# Patient Record
Sex: FEMALE | Race: WHITE | HISPANIC OR LATINO | Employment: UNEMPLOYED | URBAN - METROPOLITAN AREA
[De-identification: names, ages, dates, MRNs, and addresses within clinical notes are randomized per-mention and may not be internally consistent; named-entity substitution may affect disease eponyms.]

---

## 2024-08-29 ENCOUNTER — HOSPITAL ENCOUNTER (INPATIENT)
Facility: HOSPITAL | Age: 34
LOS: 2 days | Discharge: HOME | End: 2024-08-31
Attending: OBSTETRICS & GYNECOLOGY | Admitting: OBSTETRICS & GYNECOLOGY

## 2024-08-29 PROBLEM — O42.919 PRETERM PREMATURE RUPTURE OF MEMBRANES (PPROM) WITH UNKNOWN ONSET OF LABOR: Status: ACTIVE | Noted: 2024-08-29

## 2024-08-29 LAB
ABO GROUP BLD: NORMAL
ABO GROUP BLD: NORMAL
ALP SERPL-CCNC: 41 U/L (ref 39–117)
ALT SERPL W P-5'-P-CCNC: 10 U/L (ref 1–33)
AST SERPL-CCNC: 12 U/L (ref 1–32)
BASOPHILS # BLD AUTO: 0.02 10*3/MM3 (ref 0–0.2)
BASOPHILS NFR BLD AUTO: 0.2 % (ref 0–1.5)
BILIRUB SERPL-MCNC: 0.2 MG/DL (ref 0–1.2)
BLD GP AB SCN SERPL QL: NEGATIVE
CREAT SERPL-MCNC: 0.48 MG/DL (ref 0.57–1)
D-LACTATE SERPL-SCNC: 1.1 MMOL/L (ref 0.5–2)
DEPRECATED RDW RBC AUTO: 42.8 FL (ref 37–54)
EOSINOPHIL # BLD AUTO: 0.14 10*3/MM3 (ref 0–0.4)
EOSINOPHIL NFR BLD AUTO: 1.4 % (ref 0.3–6.2)
ERYTHROCYTE [DISTWIDTH] IN BLOOD BY AUTOMATED COUNT: 13.2 % (ref 12.3–15.4)
GLUCOSE BLDC GLUCOMTR-MCNC: 110 MG/DL (ref 70–130)
GLUCOSE BLDC GLUCOMTR-MCNC: 92 MG/DL (ref 70–130)
HCT VFR BLD AUTO: 38.1 % (ref 34–46.6)
HGB BLD-MCNC: 13 G/DL (ref 12–15.9)
IMM GRANULOCYTES # BLD AUTO: 0.16 10*3/MM3 (ref 0–0.05)
IMM GRANULOCYTES NFR BLD AUTO: 1.6 % (ref 0–0.5)
LDH SERPL-CCNC: 147 U/L (ref 135–214)
LYMPHOCYTES # BLD AUTO: 1.78 10*3/MM3 (ref 0.7–3.1)
LYMPHOCYTES NFR BLD AUTO: 18 % (ref 19.6–45.3)
MCH RBC QN AUTO: 30.2 PG (ref 26.6–33)
MCHC RBC AUTO-ENTMCNC: 34.1 G/DL (ref 31.5–35.7)
MCV RBC AUTO: 88.4 FL (ref 79–97)
MONOCYTES # BLD AUTO: 0.72 10*3/MM3 (ref 0.1–0.9)
MONOCYTES NFR BLD AUTO: 7.3 % (ref 5–12)
NEUTROPHILS NFR BLD AUTO: 7.09 10*3/MM3 (ref 1.7–7)
NEUTROPHILS NFR BLD AUTO: 71.5 % (ref 42.7–76)
NRBC BLD AUTO-RTO: 0 /100 WBC (ref 0–0.2)
PLATELET # BLD AUTO: 204 10*3/MM3 (ref 140–450)
PMV BLD AUTO: 9.9 FL (ref 6–12)
RBC # BLD AUTO: 4.31 10*6/MM3 (ref 3.77–5.28)
RH BLD: POSITIVE
RH BLD: POSITIVE
T&S EXPIRATION DATE: NORMAL
URATE SERPL-MCNC: 3.6 MG/DL (ref 2.4–5.7)
WBC NRBC COR # BLD AUTO: 9.91 10*3/MM3 (ref 3.4–10.8)

## 2024-08-29 PROCEDURE — 86780 TREPONEMA PALLIDUM: CPT | Performed by: OBSTETRICS & GYNECOLOGY

## 2024-08-29 PROCEDURE — 86901 BLOOD TYPING SEROLOGIC RH(D): CPT | Performed by: OBSTETRICS & GYNECOLOGY

## 2024-08-29 PROCEDURE — 82247 BILIRUBIN TOTAL: CPT | Performed by: OBSTETRICS & GYNECOLOGY

## 2024-08-29 PROCEDURE — 99223 1ST HOSP IP/OBS HIGH 75: CPT | Performed by: STUDENT IN AN ORGANIZED HEALTH CARE EDUCATION/TRAINING PROGRAM

## 2024-08-29 PROCEDURE — 82565 ASSAY OF CREATININE: CPT | Performed by: OBSTETRICS & GYNECOLOGY

## 2024-08-29 PROCEDURE — 36415 COLL VENOUS BLD VENIPUNCTURE: CPT | Performed by: OBSTETRICS & GYNECOLOGY

## 2024-08-29 PROCEDURE — 86901 BLOOD TYPING SEROLOGIC RH(D): CPT

## 2024-08-29 PROCEDURE — 86900 BLOOD TYPING SEROLOGIC ABO: CPT

## 2024-08-29 PROCEDURE — 83615 LACTATE (LD) (LDH) ENZYME: CPT | Performed by: OBSTETRICS & GYNECOLOGY

## 2024-08-29 PROCEDURE — 25010000002 AMPICILLIN PER 500 MG: Performed by: OBSTETRICS & GYNECOLOGY

## 2024-08-29 PROCEDURE — 86850 RBC ANTIBODY SCREEN: CPT | Performed by: OBSTETRICS & GYNECOLOGY

## 2024-08-29 PROCEDURE — 84075 ASSAY ALKALINE PHOSPHATASE: CPT | Performed by: OBSTETRICS & GYNECOLOGY

## 2024-08-29 PROCEDURE — 80053 COMPREHEN METABOLIC PANEL: CPT | Performed by: OBSTETRICS & GYNECOLOGY

## 2024-08-29 PROCEDURE — 86900 BLOOD TYPING SEROLOGIC ABO: CPT | Performed by: OBSTETRICS & GYNECOLOGY

## 2024-08-29 PROCEDURE — 25810000003 LACTATED RINGERS PER 1000 ML: Performed by: OBSTETRICS & GYNECOLOGY

## 2024-08-29 PROCEDURE — 84550 ASSAY OF BLOOD/URIC ACID: CPT | Performed by: OBSTETRICS & GYNECOLOGY

## 2024-08-29 PROCEDURE — 82948 REAGENT STRIP/BLOOD GLUCOSE: CPT

## 2024-08-29 PROCEDURE — 85025 COMPLETE CBC W/AUTO DIFF WBC: CPT | Performed by: OBSTETRICS & GYNECOLOGY

## 2024-08-29 PROCEDURE — 83605 ASSAY OF LACTIC ACID: CPT | Performed by: OBSTETRICS & GYNECOLOGY

## 2024-08-29 RX ORDER — ASPIRIN 81 MG/1
81 TABLET, CHEWABLE ORAL DAILY
Status: ON HOLD | COMMUNITY

## 2024-08-29 RX ORDER — DOCUSATE SODIUM 100 MG/1
100 CAPSULE, LIQUID FILLED ORAL 2 TIMES DAILY PRN
Status: DISCONTINUED | OUTPATIENT
Start: 2024-08-29 | End: 2024-08-31 | Stop reason: HOSPADM

## 2024-08-29 RX ORDER — PRENATAL WITH FERROUS FUM AND FOLIC ACID 3080; 920; 120; 400; 22; 1.84; 3; 20; 10; 1; 12; 200; 27; 25; 2 [IU]/1; [IU]/1; MG/1; [IU]/1; MG/1; MG/1; MG/1; MG/1; MG/1; MG/1; UG/1; MG/1; MG/1; MG/1; MG/1
1 TABLET ORAL DAILY
Status: ON HOLD | COMMUNITY

## 2024-08-29 RX ORDER — ACETAMINOPHEN 325 MG/1
650 TABLET ORAL EVERY 4 HOURS PRN
Status: DISCONTINUED | OUTPATIENT
Start: 2024-08-29 | End: 2024-08-29

## 2024-08-29 RX ORDER — ONDANSETRON 2 MG/ML
4 INJECTION INTRAMUSCULAR; INTRAVENOUS EVERY 8 HOURS PRN
Status: DISCONTINUED | OUTPATIENT
Start: 2024-08-29 | End: 2024-08-31 | Stop reason: HOSPADM

## 2024-08-29 RX ORDER — ONDANSETRON 4 MG/1
8 TABLET, ORALLY DISINTEGRATING ORAL EVERY 8 HOURS PRN
Status: DISCONTINUED | OUTPATIENT
Start: 2024-08-29 | End: 2024-08-31 | Stop reason: HOSPADM

## 2024-08-29 RX ORDER — SODIUM CHLORIDE 9 MG/ML
40 INJECTION, SOLUTION INTRAVENOUS AS NEEDED
Status: DISCONTINUED | OUTPATIENT
Start: 2024-08-29 | End: 2024-08-31 | Stop reason: HOSPADM

## 2024-08-29 RX ORDER — INSULIN GLARGINE 100 [IU]/ML
100 INJECTION, SOLUTION SUBCUTANEOUS 2 TIMES DAILY
Status: ON HOLD | COMMUNITY
End: 2024-08-31

## 2024-08-29 RX ORDER — LIDOCAINE HYDROCHLORIDE 10 MG/ML
0.5 INJECTION, SOLUTION EPIDURAL; INFILTRATION; INTRACAUDAL; PERINEURAL ONCE AS NEEDED
Status: DISCONTINUED | OUTPATIENT
Start: 2024-08-29 | End: 2024-08-31 | Stop reason: HOSPADM

## 2024-08-29 RX ORDER — AMOXICILLIN 250 MG/1
500 CAPSULE ORAL EVERY 8 HOURS
Status: DISCONTINUED | OUTPATIENT
Start: 2024-08-31 | End: 2024-08-31 | Stop reason: HOSPADM

## 2024-08-29 RX ORDER — SODIUM CHLORIDE, SODIUM LACTATE, POTASSIUM CHLORIDE, CALCIUM CHLORIDE 600; 310; 30; 20 MG/100ML; MG/100ML; MG/100ML; MG/100ML
125 INJECTION, SOLUTION INTRAVENOUS CONTINUOUS
Status: DISCONTINUED | OUTPATIENT
Start: 2024-08-29 | End: 2024-08-31 | Stop reason: HOSPADM

## 2024-08-29 RX ORDER — SODIUM CHLORIDE 0.9 % (FLUSH) 0.9 %
10 SYRINGE (ML) INJECTION EVERY 12 HOURS SCHEDULED
Status: DISCONTINUED | OUTPATIENT
Start: 2024-08-29 | End: 2024-08-31 | Stop reason: HOSPADM

## 2024-08-29 RX ORDER — AZITHROMYCIN 250 MG/1
1000 TABLET, FILM COATED ORAL ONCE
Status: COMPLETED | OUTPATIENT
Start: 2024-08-29 | End: 2024-08-29

## 2024-08-29 RX ORDER — BISACODYL 10 MG
10 SUPPOSITORY, RECTAL RECTAL DAILY PRN
Status: DISCONTINUED | OUTPATIENT
Start: 2024-08-29 | End: 2024-08-31 | Stop reason: SDUPTHER

## 2024-08-29 RX ORDER — SODIUM CHLORIDE 0.9 % (FLUSH) 0.9 %
10 SYRINGE (ML) INJECTION AS NEEDED
Status: DISCONTINUED | OUTPATIENT
Start: 2024-08-29 | End: 2024-08-31 | Stop reason: HOSPADM

## 2024-08-29 RX ADMIN — AMPICILLIN SODIUM 2000 MG: 2 INJECTION, POWDER, FOR SOLUTION INTRAMUSCULAR; INTRAVENOUS at 23:08

## 2024-08-29 RX ADMIN — SODIUM CHLORIDE, POTASSIUM CHLORIDE, SODIUM LACTATE AND CALCIUM CHLORIDE 125 ML/HR: 600; 310; 30; 20 INJECTION, SOLUTION INTRAVENOUS at 19:48

## 2024-08-29 RX ADMIN — SODIUM CHLORIDE, POTASSIUM CHLORIDE, SODIUM LACTATE AND CALCIUM CHLORIDE 125 ML/HR: 600; 310; 30; 20 INJECTION, SOLUTION INTRAVENOUS at 21:58

## 2024-08-29 RX ADMIN — AZITHROMYCIN DIHYDRATE 1000 MG: 250 TABLET ORAL at 23:11

## 2024-08-30 ENCOUNTER — APPOINTMENT (OUTPATIENT)
Dept: WOMENS IMAGING | Facility: HOSPITAL | Age: 34
End: 2024-08-30

## 2024-08-30 LAB
ALBUMIN SERPL-MCNC: 4 G/DL (ref 3.5–5.2)
ALBUMIN/GLOB SERPL: 1.3 G/DL
ALP SERPL-CCNC: 39 U/L (ref 39–117)
ALT SERPL W P-5'-P-CCNC: 10 U/L (ref 1–33)
ANION GAP SERPL CALCULATED.3IONS-SCNC: 14 MMOL/L (ref 5–15)
AST SERPL-CCNC: 12 U/L (ref 1–32)
BASOPHILS # BLD AUTO: 0.02 10*3/MM3 (ref 0–0.2)
BASOPHILS NFR BLD AUTO: 0.2 % (ref 0–1.5)
BILIRUB SERPL-MCNC: 0.2 MG/DL (ref 0–1.2)
BUN SERPL-MCNC: 7 MG/DL (ref 6–20)
BUN/CREAT SERPL: 15.2 (ref 7–25)
CALCIUM SPEC-SCNC: 10.1 MG/DL (ref 8.6–10.5)
CHLORIDE SERPL-SCNC: 103 MMOL/L (ref 98–107)
CO2 SERPL-SCNC: 21 MMOL/L (ref 22–29)
CREAT SERPL-MCNC: 0.46 MG/DL (ref 0.57–1)
DEPRECATED RDW RBC AUTO: 44 FL (ref 37–54)
EGFRCR SERPLBLD CKD-EPI 2021: 130.6 ML/MIN/1.73
EOSINOPHIL # BLD AUTO: 0.11 10*3/MM3 (ref 0–0.4)
EOSINOPHIL NFR BLD AUTO: 1.1 % (ref 0.3–6.2)
ERYTHROCYTE [DISTWIDTH] IN BLOOD BY AUTOMATED COUNT: 13.3 % (ref 12.3–15.4)
GLOBULIN UR ELPH-MCNC: 3 GM/DL
GLUCOSE BLDC GLUCOMTR-MCNC: 104 MG/DL (ref 70–130)
GLUCOSE BLDC GLUCOMTR-MCNC: 170 MG/DL (ref 70–130)
GLUCOSE BLDC GLUCOMTR-MCNC: 256 MG/DL (ref 70–130)
GLUCOSE BLDC GLUCOMTR-MCNC: 82 MG/DL (ref 70–130)
GLUCOSE BLDC GLUCOMTR-MCNC: 92 MG/DL (ref 70–130)
GLUCOSE SERPL-MCNC: 78 MG/DL (ref 65–99)
HCT VFR BLD AUTO: 35.3 % (ref 34–46.6)
HGB BLD-MCNC: 11.5 G/DL (ref 12–15.9)
IMM GRANULOCYTES # BLD AUTO: 0.13 10*3/MM3 (ref 0–0.05)
IMM GRANULOCYTES NFR BLD AUTO: 1.3 % (ref 0–0.5)
LYMPHOCYTES # BLD AUTO: 1.8 10*3/MM3 (ref 0.7–3.1)
LYMPHOCYTES NFR BLD AUTO: 18.4 % (ref 19.6–45.3)
MCH RBC QN AUTO: 29.3 PG (ref 26.6–33)
MCHC RBC AUTO-ENTMCNC: 32.6 G/DL (ref 31.5–35.7)
MCV RBC AUTO: 90.1 FL (ref 79–97)
MONOCYTES # BLD AUTO: 0.79 10*3/MM3 (ref 0.1–0.9)
MONOCYTES NFR BLD AUTO: 8.1 % (ref 5–12)
NEUTROPHILS NFR BLD AUTO: 6.91 10*3/MM3 (ref 1.7–7)
NEUTROPHILS NFR BLD AUTO: 70.9 % (ref 42.7–76)
NRBC BLD AUTO-RTO: 0 /100 WBC (ref 0–0.2)
PLATELET # BLD AUTO: 191 10*3/MM3 (ref 140–450)
PMV BLD AUTO: 10.1 FL (ref 6–12)
POTASSIUM SERPL-SCNC: 4.2 MMOL/L (ref 3.5–5.2)
PROT SERPL-MCNC: 7 G/DL (ref 6–8.5)
RBC # BLD AUTO: 3.92 10*6/MM3 (ref 3.77–5.28)
SODIUM SERPL-SCNC: 138 MMOL/L (ref 136–145)
TREPONEMA PALLIDUM IGG+IGM AB [PRESENCE] IN SERUM OR PLASMA BY IMMUNOASSAY: NORMAL
WBC NRBC COR # BLD AUTO: 9.76 10*3/MM3 (ref 3.4–10.8)

## 2024-08-30 PROCEDURE — 76815 OB US LIMITED FETUS(S): CPT | Performed by: OBSTETRICS & GYNECOLOGY

## 2024-08-30 PROCEDURE — 63710000001 INSULIN LISPRO (HUMAN) PER 5 UNITS: Performed by: STUDENT IN AN ORGANIZED HEALTH CARE EDUCATION/TRAINING PROGRAM

## 2024-08-30 PROCEDURE — 99254 IP/OBS CNSLTJ NEW/EST MOD 60: CPT | Performed by: OBSTETRICS & GYNECOLOGY

## 2024-08-30 PROCEDURE — 25010000002 METOCLOPRAMIDE PER 10 MG: Performed by: STUDENT IN AN ORGANIZED HEALTH CARE EDUCATION/TRAINING PROGRAM

## 2024-08-30 PROCEDURE — 25010000002 DIPHENHYDRAMINE PER 50 MG: Performed by: STUDENT IN AN ORGANIZED HEALTH CARE EDUCATION/TRAINING PROGRAM

## 2024-08-30 PROCEDURE — 25010000002 AMPICILLIN PER 500 MG: Performed by: OBSTETRICS & GYNECOLOGY

## 2024-08-30 PROCEDURE — 63710000001 INSULIN GLARGINE PER 5 UNITS: Performed by: STUDENT IN AN ORGANIZED HEALTH CARE EDUCATION/TRAINING PROGRAM

## 2024-08-30 PROCEDURE — 76815 OB US LIMITED FETUS(S): CPT

## 2024-08-30 PROCEDURE — 25810000003 LACTATED RINGERS PER 1000 ML: Performed by: OBSTETRICS & GYNECOLOGY

## 2024-08-30 PROCEDURE — 82948 REAGENT STRIP/BLOOD GLUCOSE: CPT

## 2024-08-30 PROCEDURE — 85025 COMPLETE CBC W/AUTO DIFF WBC: CPT | Performed by: STUDENT IN AN ORGANIZED HEALTH CARE EDUCATION/TRAINING PROGRAM

## 2024-08-30 PROCEDURE — 25010000002 BETAMETHASONE ACET & SOD PHOS PER 4 MG: Performed by: OBSTETRICS & GYNECOLOGY

## 2024-08-30 RX ORDER — INSULIN LISPRO 100 [IU]/ML
60 INJECTION, SOLUTION INTRAVENOUS; SUBCUTANEOUS
Status: DISCONTINUED | OUTPATIENT
Start: 2024-08-30 | End: 2024-08-31 | Stop reason: HOSPADM

## 2024-08-30 RX ORDER — ACETAMINOPHEN 325 MG/1
650 TABLET ORAL EVERY 6 HOURS PRN
Status: DISCONTINUED | OUTPATIENT
Start: 2024-08-30 | End: 2024-08-31 | Stop reason: HOSPADM

## 2024-08-30 RX ORDER — METOCLOPRAMIDE HYDROCHLORIDE 5 MG/ML
10 INJECTION INTRAMUSCULAR; INTRAVENOUS ONCE
Status: COMPLETED | OUTPATIENT
Start: 2024-08-30 | End: 2024-08-30

## 2024-08-30 RX ORDER — BETAMETHASONE SODIUM PHOSPHATE AND BETAMETHASONE ACETATE 3; 3 MG/ML; MG/ML
12 INJECTION, SUSPENSION INTRA-ARTICULAR; INTRALESIONAL; INTRAMUSCULAR; SOFT TISSUE EVERY 24 HOURS
Status: COMPLETED | OUTPATIENT
Start: 2024-08-30 | End: 2024-08-31

## 2024-08-30 RX ORDER — DIPHENHYDRAMINE HYDROCHLORIDE 50 MG/ML
25 INJECTION INTRAMUSCULAR; INTRAVENOUS ONCE
Status: COMPLETED | OUTPATIENT
Start: 2024-08-30 | End: 2024-08-30

## 2024-08-30 RX ORDER — FAMOTIDINE 10 MG/ML
20 INJECTION, SOLUTION INTRAVENOUS EVERY 12 HOURS SCHEDULED
Status: DISCONTINUED | OUTPATIENT
Start: 2024-08-30 | End: 2024-08-31 | Stop reason: HOSPADM

## 2024-08-30 RX ADMIN — METOCLOPRAMIDE 10 MG: 5 INJECTION, SOLUTION INTRAMUSCULAR; INTRAVENOUS at 01:33

## 2024-08-30 RX ADMIN — INSULIN LISPRO 60 UNITS: 100 INJECTION, SOLUTION INTRAVENOUS; SUBCUTANEOUS at 16:37

## 2024-08-30 RX ADMIN — FAMOTIDINE 20 MG: 10 INJECTION, SOLUTION INTRAVENOUS at 10:05

## 2024-08-30 RX ADMIN — DIPHENHYDRAMINE HYDROCHLORIDE 25 MG: 50 INJECTION INTRAMUSCULAR; INTRAVENOUS at 01:34

## 2024-08-30 RX ADMIN — SODIUM CHLORIDE, POTASSIUM CHLORIDE, SODIUM LACTATE AND CALCIUM CHLORIDE 125 ML/HR: 600; 310; 30; 20 INJECTION, SOLUTION INTRAVENOUS at 13:31

## 2024-08-30 RX ADMIN — SODIUM CHLORIDE, POTASSIUM CHLORIDE, SODIUM LACTATE AND CALCIUM CHLORIDE 125 ML/HR: 600; 310; 30; 20 INJECTION, SOLUTION INTRAVENOUS at 03:42

## 2024-08-30 RX ADMIN — AMPICILLIN SODIUM 2000 MG: 2 INJECTION, POWDER, FOR SOLUTION INTRAMUSCULAR; INTRAVENOUS at 10:05

## 2024-08-30 RX ADMIN — FAMOTIDINE 20 MG: 10 INJECTION, SOLUTION INTRAVENOUS at 22:32

## 2024-08-30 RX ADMIN — AMPICILLIN SODIUM 2000 MG: 2 INJECTION, POWDER, FOR SOLUTION INTRAMUSCULAR; INTRAVENOUS at 03:42

## 2024-08-30 RX ADMIN — AMPICILLIN SODIUM 2000 MG: 2 INJECTION, POWDER, FOR SOLUTION INTRAMUSCULAR; INTRAVENOUS at 22:31

## 2024-08-30 RX ADMIN — INSULIN GLARGINE 80 UNITS: 100 INJECTION, SOLUTION SUBCUTANEOUS at 01:33

## 2024-08-30 RX ADMIN — INSULIN LISPRO 60 UNITS: 100 INJECTION, SOLUTION INTRAVENOUS; SUBCUTANEOUS at 08:38

## 2024-08-30 RX ADMIN — SODIUM CHLORIDE, POTASSIUM CHLORIDE, SODIUM LACTATE AND CALCIUM CHLORIDE 125 ML/HR: 600; 310; 30; 20 INJECTION, SOLUTION INTRAVENOUS at 23:53

## 2024-08-30 RX ADMIN — INSULIN GLARGINE 90 UNITS: 100 INJECTION, SOLUTION SUBCUTANEOUS at 22:31

## 2024-08-30 RX ADMIN — AMPICILLIN SODIUM 2000 MG: 2 INJECTION, POWDER, FOR SOLUTION INTRAMUSCULAR; INTRAVENOUS at 15:26

## 2024-08-30 RX ADMIN — FAMOTIDINE 20 MG: 10 INJECTION, SOLUTION INTRAVENOUS at 01:34

## 2024-08-30 RX ADMIN — BETAMETHASONE SODIUM PHOSPHATE AND BETAMETHASONE ACETATE 12 MG: 3; 3 INJECTION, SUSPENSION INTRA-ARTICULAR; INTRALESIONAL; INTRAMUSCULAR at 15:26

## 2024-08-30 RX ADMIN — Medication 10 ML: at 09:00

## 2024-08-30 NOTE — PROGRESS NOTES
Visited with patient to establish rapport, provide emotional and spiritual support.  Patient is [employer], does not go to any particular Congregation or denomination.  Shared that she believes God will help her carry her baby to 28 weeks, is staying positive with that belief.  I affirmed her optimism while at the same time reminded her of what the doctors said about infection, and she agreed.  We had a long conversation (this  speaks Kiswahili) about her family of origin, her children, her significant other Haris who was present in the room and supportive.

## 2024-08-30 NOTE — H&P
"[facility]  Obstetric History and Physical    Chief Complaint   Patient presents with    Rupture of Membranes       HPI:    Patient is a 33 y.o. female  currently at 21w6d, who presents after being seen at .   She is a type II DM and sees Dr. Polanco for her diabetes management.  She reported that she had been having some cramping and vaginal leaking for the last week and had presented to triage three days in a row, but she was always sent home.   Dr. Polanco scanned her and noted that she had very little fluid around the infant suspicious for PPROM.  Demetra continues to be tender at the uterus  She denies further leaking, bleeding.  She denies fever or chills.   She is Croatian speaking only.          The following portions of the patients history were reviewed and updated as appropriate: current medications, allergies, past medical history, past surgical history, past family history, past social history and problem list .       Prenatal Information:   Maternal Prenatal Labs  Blood Type ABO Type   Date Value Ref Range Status   2024 B  Final      Rh Status RH type   Date Value Ref Range Status   2024 Positive  Final      Antibody Screen Antibody Screen   Date Value Ref Range Status   2024 Negative  Final      Gonnorhea No results found for: \"GCCX\"   Chlamydia No results found for: \"CLAMYDCU\"   RPR No results found for: \"RPR\"   Syphilis Antibody No results found for: \"SYPHILIS\"   Rubella No results found for: \"RUBELLAIGGIN\"   Hepatitis B Surface Antigen No results found for: \"HEPBSAG\"   HIV-1 Antibody No results found for: \"LABHIV1\"   Hepatitis C Antibody No results found for: \"HEPCAB\"   Rapid Urin Drug Screen No results found for: \"AMPMETHU\", \"BARBITSCNUR\", \"LABBENZSCN\", \"LABMETHSCN\", \"LABOPIASCN\", \"THCURSCR\", \"COCAINEUR\", \"AMPHETSCREEN\", \"PROPOXSCN\", \"BUPRENORSCNU\", \"METAMPSCNUR\", \"OXYCODONESCN\", \"TRICYCLICSCN\"   Group B Strep Culture No results found for: \"GBSANTIGEN\"           " External Prenatal Results       Pregnancy Outside Results - Transcribed From Office Records - See Scanned Records For Details       Test Value Date Time    ABO       Rh       Antibody Screen       Varicella IgG       Rubella       Hgb  13.0 g/dL 24    Hct  38.1 % 24    HgB A1c        1h GTT       3h GTT Fasting       3h GTT 1 hour       3h GTT 2 hour       3h GTT 3 hour        Gonorrhea (discrete)       Chlamydia (discrete)       RPR       Syphils cascade: TP-Ab (FTA)       TP-Ab       TP-Ab (EIA)       TPPA       HBsAg       Herpes Simplex Virus PCR       Herpes Simplex VIrus Culture       HIV       Hep C RNA Quant PCR       Hep C Antibody       AFP       NIPT       Cystic Fibroisis        Group B Strep       GBS Susceptibility to Clindamycin       GBS Susceptibility to Erythromycin       Fetal Fibronectin       Genetic Testing, Maternal Blood                 Drug Screening       Test Value Date Time    Urine Drug Screen       Amphetamine Screen       Barbiturate Screen       Benzodiazepine Screen       Methadone Screen       Phencyclidine Screen       Opiates Screen       THC Screen       Cocaine Screen       Propoxyphene Screen       Buprenorphine Screen       Methamphetamine Screen       Oxycodone Screen       Tricyclic Antidepressants Screen                 Legend    ^: Historical                              Past OB History:     OB History    Para Term  AB Living   4 2 2 0 1 2   SAB IAB Ectopic Molar Multiple Live Births   1 0 0 0 0 0      # Outcome Date GA Lbr Clayton/2nd Weight Sex Type Anes PTL Lv   4 Current            3 SAB 2024 8w0d    SAB      2 Term 17 38w0d   M CS-Unspec      1 Term 16 42w0d   F Vag-Spont          Past Medical History: Past Medical History:   Diagnosis Date    DM type 2 (diabetes mellitus, type 2)       Past Surgical History Past Surgical History:   Procedure Laterality Date     SECTION      CHOLECYSTECTOMY        Family History:  History reviewed. No pertinent family history.   Social History:  reports that she has never smoked. She has never been exposed to tobacco smoke. She has never used smokeless tobacco.   reports that she does not currently use alcohol.   reports that she does not currently use drugs.        Review of Systems  Denies fever, HA, CP, Shortness of air, muscle weakness,                                             and rashes      Objective     Vital Signs Range for the last 24 hours  Temperature: Temp:  [98.4 °F (36.9 °C)] 98.4 °F (36.9 °C)   Temp Source: Temp src: Oral   BP: BP: (126)/(70) 126/70   Pulse: Heart Rate:  [81-87] 83   Respirations:  16   SPO2: SpO2:  [97 %-98 %] 97 %   O2 Amount (l/min):     O2 Devices     Weight:       Physical Examination: General appearance - Alert, no acute physical distress, but anxious  Chest -Breathing is unlaboured   Heart - Regular rate.   Abdomen - soft, nontender, nondistended,   no rebound tenderness noted  No guarding,   Extremities - pedal edema  - none     Presentation:  Breech    Cervix: Exam by:  M.D.    Dilation:  Closed   Effacement:  Thick    Station:  High      Fetal Heart Rate Assessment   Method:     Beats/min:     Baseline:  130s   Varibility:     Accels:     Decels:     Tracing Category:       Uterine Assessment   Method: Method: per patient report   Frequency (min):     Ctx Count in 10 min:     Duration:     Intensity: Contraction Intensity: no contractions   Intensity by IUPC:     Resting Tone:     Resting Tone by IUPC:           Laboratory Results:   Lab Results   Component Value Date     08/29/2024    HGB 13.0 08/29/2024    HCT 38.1 08/29/2024    WBC 9.91 08/29/2024      Lab Results   Component Value Date    CREATININE 0.48 (L) 08/29/2024    AST 12 08/29/2024    ALT 10 08/29/2024    BILITOT 0.2 08/29/2024       Lactic Acid:   1.1         Assessment:  1. Intrauterine pregnancy at 21w6d weeks gestation  2. PPROM with probable chorioamnionitis   3. Type II  diabetes       Plan:  1. Admit  2. IV, CBC, T&S, CMP, Lactic acid  3. Will discuss further, but likely IOL with Cytotec 200 mcg Q 4 hrs.   4.  PPROM ABX       Denys Rojas MD  2024  20:50 EDT        Triage Note    Care assumed form Dr Rojas.    Complex patient with oligohydramnios starting several weeks ago. Has been evaluated by OSH previously with no evidence of rupture. Reportedly was admitted for observation and then discharged with plan for outpatient followup. Today she still has minimal fluid and reports some abdominal pain.     Her clinical picture is very concerning for previable  rupture of membranes of unclear duration, though the PPROM has not been definitively diagnosed. Given this picture, it is very possible she will develop an intrauterine infection that could lead to rapidly developing sepsis. She does not yet meet two criteria for chorioamnionitis (normal maternal and fetal heart rate, no purulent vaginal discharge, no leukocytosis).    Assessment:  Suspected previable  rupture of membranes  Abdominal pain   T2DM  H/o CD x1 (2017 in [facility] for macrosomia -- infant weight 8lb @ 38wks)  Possible fetal cardiac defect      Plan:  - Admit for observation  - Latency abx  - Hold ANCS for now given <22wks GA and need to monitor WBC for leukocytosis  - Repeat CBC in AM  - PDC US in AM -- concern for fetal cardiac defect  - Insulin for T2DM control: Lantus 100U BID, Humalog 60U TID AC with corrections       utilized via iPad for conversation.    Dispo: Admit to floor. Unclear duration of admission.  D/w Dr Sherri Rolle MD  24  22:04 EDT    OB Laborist

## 2024-08-30 NOTE — CASE MANAGEMENT/SOCIAL WORK
Continued Stay Note  [facility]     Patient Name: Demetra Betancur  MRN: [id number]  Today's Date: 8/30/2024    Admit Date: 8/29/2024    Plan: MSW available   Discharge Plan       Row Name 08/30/24 1534       Plan    Plan MSW available    Plan Comments Spoke with pt using Ipad  # [id number]. Pt lives with FOB and her 7yo daughter and 8 yo son. Children attend [employer]. Pt reports food insecurity, struggles with transportation and utility bills in [facility]. Pt is unemployed. FOB is employed and usually is paid $300 per week. Pt reports she went to local food pantry 2 weeks ago. States she has an electric bill due in Sept 2024 that she is struggling to pay. MSW did mention speaking with the  for resources. MSW did refer pt to [employer], [employer], [employer] for assistance with pt's verbal permission.                   Discharge Codes    No documentation.                       MARY Shaw

## 2024-08-30 NOTE — CONSULTS
"Maternal Fetal Medicine Consult    Patient Name: Demetra Betancur  : 1990  MRN: [id number]  Date of Service: 2024  Requesting Provider: Denys Rojas MD    ID: 33 y.o.  at 22w0d    Consultation due to:  premature rupture of membranes (PPROM) with unknown onset of labor    Pt was sent to L&D on 2024 after her anatomic survey revealed GERARDO of only 1.7cms. Upon presentation, the reported to the MS3 that \"She has been leaking fluid from her vagina for the past 3 days. She describes the fluid as \"like pee\". She describes the color as yellow, with a urine-like odor. She says the sneezing and crouching worsens the fluid discharge.\" A speculum exam was performed in Triage and was negative for pooling. An Amnisure was done and was negative. Patient was deemed not ruptured. Her GERARDO was repeated daily and remained extremely low despite IVH. After 3 days, patient was sent home with precautions.     Patient then re-presented to Triage daily on 3 separate days with complaints of vaginal bleeding and lower abdominal/low back pain and pressure. She was again examined and as speculum exam was negative and Amnisure was negative, she was sent home.     Patient then presented to my  Diabetes in Pregnancy [facility] and told the story. She reports having blood on the tissue when she wiped while going to the bathroom. When I asked if she had any \"gushes of fluid\" she replied \"no.\" She then showed me a picture of her underwear from the day prior -- it showed cotton underwear soaked with watery blood, consistent with rupture of membranes with a trace of blood tinge. On exam, her lower abdomen was TTP and there was referred discomfort to the lower abdomen with palpation of the uterine fundus.     I had a long discussion with the patient through an excellent  explaining that I am fairly sure she has ruptured her amniotic sac. I reviewed the complications of this for baby including pulmonary " hypoplasia, contractures, infection, death. I also reviewed maternal complications including infection, possibly severe and necessitating ICU admission, hemorrhage, and death. I explained that there is no real way to know if baby will develop functional lungs until baby is born.     I recommended admission to [facility] (patient adamently declined to return to ) for further evaluation and discussion.     Since admission, patient has had a normal WBC count and has been AFVSS. Decision was made to start latency antibiotics and get a consult from NICU and continue to discuss.     Pregnancy course significant for:  Patient Active Problem List    Diagnosis     * premature rupture of membranes (PPROM) with unknown onset of labor [O42.919]        Prenatal Labs  Lab Results   Component Value Date    HGB 11.5 (L) 2024    HEPBSAG Negative 2024    ABO B 2024    RH Positive 2024    ABSCRN Negative 2024    [id number] Non Reactive 2024         OB HISTORY    OB History          4    Para   2    Term   2            AB   1    Living   2         SAB   1    IAB        Ectopic        Molar        Multiple        Live Births                    PAST MEDICAL HISTORY    Past Medical History:   Diagnosis Date    DM type 2 (diabetes mellitus, type 2)        PAST SURGICAL HISTORY     has a past surgical history that includes  section and Cholecystectomy.    CURRENT MEDICATIONS    No current facility-administered medications on file prior to encounter.     Current Outpatient Medications on File Prior to Encounter   Medication Sig Dispense Refill    aspirin 81 MG chewable tablet Chew 1 tablet Daily.      insulin glargine (LANTUS, SEMGLEE) 100 UNIT/ML injection Inject 100 Units under the skin into the appropriate area as directed 2 (Two) Times a Day.      Prenatal Vit-Fe Fumarate-FA (Prenatal ) 27-1 MG tablet tablet Take 1 tablet by mouth Daily.         ALLERGIES    Patient has no  known allergies.    SOCIAL HISTORY    Social History     Tobacco Use   Smoking Status Never    Passive exposure: Never   Smokeless Tobacco Never     Social History     Substance and Sexual Activity   Alcohol Use Not Currently     Social History     Substance and Sexual Activity   Drug Use Not Currently       FAMILY HISTORY  The patient has a family history of not on file.     REVIEW OF SYSTEMS  Reports fetal movement is stable  Denies leakage of amniotic fluid.   Denies vaginal bleeding  She reports Some cramping  All other systems reviewed and are negative    PHYSICAL EXAMINATION    Vital Signs Range for the last 24 hours  Temperature: Temp:  [98.1 °F (36.7 °C)-98.8 °F (37.1 °C)] 98.1 °F (36.7 °C)   Temp Source: Temp src: Oral   BP: BP: (111-133)/(57-78) 129/63   Pulse: Heart Rate:  [71-98] 71   Respirations: Resp:  [16] 16   Weight:       Constitutional:  Well developed, well nourished, no acute distress, well-groomed.   Respiratory:  No increased work of breathing.   Cardiovascular:  Appears well perfused.   Gastrointestinal:  Soft, gravid, nontender.  Uterus: Fundus appropriate for dates. Mildly tender during ultrasound.  Neurologic:  Alert & oriented x 3,  no focal deficits noted.   Psychiatric:  Speech and behavior appropriate.   Extremities: No cyanosis, clubbing or edema.    Ultrasound:  Breech, S=D, GERARDO 1.7cms, anterior placenta, nl CL, markedly limited anatomy survey but normal kidneys, bladder, and stomach visualized.     Labs:  Labs:  Lab Results   Component Value Date    WBC 9.76 08/30/2024    HGB 11.5 (L) 08/30/2024    HCT 35.3 08/30/2024    MCV 90.1 08/30/2024     08/30/2024     (H) 03/14/2022    POCGLU 82 08/30/2024    CREATININE 0.48 (L) 08/29/2024    CREATININE 0.46 (L) 08/29/2024    URICACID 3.6 08/29/2024    AST 12 08/29/2024    AST 12 08/29/2024    ALT 10 08/29/2024    ALT 10 08/29/2024     08/29/2024     Results from last 7 days   Lab Units 08/29/24 2015 08/29/24  1946    ABO TYPING  B B   RH TYPING  Positive Positive   ANTIBODY SCREEN   --  Negative       ASSESSMENT/PLAN:  33 y.o.  at 22w0d Admitted for evaluation and discussion of risk/options/wishes in pregnancy complicated by previable PPROM at at least 20wks of pregnancy. After further discussion and counseling by both the NICU attending and myself, patient would like to continue pregnancy with close outpatient monitoring. Once reaches 24wks, will continue to discuss her wishes as she does want baby resuscitated, but does not want to go back to . Will consider whether can send her to [place] or [facility] for management.     Complete IV portion of latency antibiotics  Will give betamethasone for fetal lung maturity  Plan is for follow-up next week on Tuesday in [facility] for OB appointment and ultrasound, from there will monitor 1-2x/wk and weekly ultrasound  Strict precautions reviewed, patient aware of risks    Approximately 45 minutes floor time was spent in care of this patient, of which greater than 50% was spent in face-to-face consultation and coordination of care.    Megha Polanco MD   24   13:31 EDT

## 2024-08-31 VITALS
HEART RATE: 71 BPM | RESPIRATION RATE: 18 BRPM | DIASTOLIC BLOOD PRESSURE: 66 MMHG | OXYGEN SATURATION: 98 % | TEMPERATURE: 99.3 F | SYSTOLIC BLOOD PRESSURE: 120 MMHG

## 2024-08-31 LAB
GLUCOSE BLDC GLUCOMTR-MCNC: 183 MG/DL (ref 70–130)
GLUCOSE BLDC GLUCOMTR-MCNC: 184 MG/DL (ref 70–130)

## 2024-08-31 PROCEDURE — 63710000001 INSULIN GLARGINE PER 5 UNITS: Performed by: STUDENT IN AN ORGANIZED HEALTH CARE EDUCATION/TRAINING PROGRAM

## 2024-08-31 PROCEDURE — 25010000002 BETAMETHASONE ACET & SOD PHOS PER 4 MG: Performed by: OBSTETRICS & GYNECOLOGY

## 2024-08-31 PROCEDURE — 63710000001 INSULIN LISPRO (HUMAN) PER 5 UNITS: Performed by: STUDENT IN AN ORGANIZED HEALTH CARE EDUCATION/TRAINING PROGRAM

## 2024-08-31 PROCEDURE — 25810000003 LACTATED RINGERS PER 1000 ML: Performed by: OBSTETRICS & GYNECOLOGY

## 2024-08-31 PROCEDURE — 82948 REAGENT STRIP/BLOOD GLUCOSE: CPT

## 2024-08-31 PROCEDURE — 99239 HOSP IP/OBS DSCHRG MGMT >30: CPT | Performed by: OBSTETRICS & GYNECOLOGY

## 2024-08-31 PROCEDURE — 25010000002 AMPICILLIN PER 500 MG: Performed by: OBSTETRICS & GYNECOLOGY

## 2024-08-31 RX ORDER — POLYETHYLENE GLYCOL 3350 17 G/17G
17 POWDER, FOR SOLUTION ORAL DAILY
Qty: 238 G | Refills: 2 | Status: ON HOLD | OUTPATIENT
Start: 2024-08-31

## 2024-08-31 RX ORDER — AMOXICILLIN 500 MG/1
500 CAPSULE ORAL EVERY 8 HOURS
Qty: 15 CAPSULE | Refills: 0 | Status: ON HOLD | OUTPATIENT
Start: 2024-08-31

## 2024-08-31 RX ORDER — POLYETHYLENE GLYCOL 3350 17 G/17G
17 POWDER, FOR SOLUTION ORAL DAILY
Status: DISCONTINUED | OUTPATIENT
Start: 2024-08-31 | End: 2024-08-31 | Stop reason: HOSPADM

## 2024-08-31 RX ORDER — BISACODYL 10 MG
10 SUPPOSITORY, RECTAL RECTAL DAILY
Status: DISCONTINUED | OUTPATIENT
Start: 2024-08-31 | End: 2024-08-31 | Stop reason: HOSPADM

## 2024-08-31 RX ORDER — INSULIN GLARGINE 100 [IU]/ML
100 INJECTION, SOLUTION SUBCUTANEOUS 2 TIMES DAILY
Qty: 10 ML | Refills: 12 | Status: ON HOLD | OUTPATIENT
Start: 2024-08-31

## 2024-08-31 RX ADMIN — SODIUM CHLORIDE, POTASSIUM CHLORIDE, SODIUM LACTATE AND CALCIUM CHLORIDE 125 ML/HR: 600; 310; 30; 20 INJECTION, SOLUTION INTRAVENOUS at 09:12

## 2024-08-31 RX ADMIN — FAMOTIDINE 20 MG: 10 INJECTION, SOLUTION INTRAVENOUS at 09:16

## 2024-08-31 RX ADMIN — AMPICILLIN SODIUM 2000 MG: 2 INJECTION, POWDER, FOR SOLUTION INTRAMUSCULAR; INTRAVENOUS at 09:14

## 2024-08-31 RX ADMIN — POLYETHYLENE GLYCOL 3350 17 G: 17 POWDER, FOR SOLUTION ORAL at 13:18

## 2024-08-31 RX ADMIN — DOCUSATE SODIUM 100 MG: 100 CAPSULE, LIQUID FILLED ORAL at 02:33

## 2024-08-31 RX ADMIN — INSULIN LISPRO 60 UNITS: 100 INJECTION, SOLUTION INTRAVENOUS; SUBCUTANEOUS at 09:48

## 2024-08-31 RX ADMIN — BETAMETHASONE SODIUM PHOSPHATE AND BETAMETHASONE ACETATE 12 MG: 3; 3 INJECTION, SUSPENSION INTRA-ARTICULAR; INTRALESIONAL; INTRAMUSCULAR at 15:47

## 2024-08-31 RX ADMIN — AMPICILLIN SODIUM 2000 MG: 2 INJECTION, POWDER, FOR SOLUTION INTRAMUSCULAR; INTRAVENOUS at 15:47

## 2024-08-31 RX ADMIN — INSULIN LISPRO 60 UNITS: 100 INJECTION, SOLUTION INTRAVENOUS; SUBCUTANEOUS at 16:57

## 2024-08-31 RX ADMIN — ACETAMINOPHEN 650 MG: 325 TABLET ORAL at 00:09

## 2024-08-31 RX ADMIN — INSULIN GLARGINE 90 UNITS: 100 INJECTION, SOLUTION SUBCUTANEOUS at 09:47

## 2024-08-31 RX ADMIN — AMPICILLIN SODIUM 2000 MG: 2 INJECTION, POWDER, FOR SOLUTION INTRAMUSCULAR; INTRAVENOUS at 04:06

## 2024-08-31 NOTE — DISCHARGE SUMMARY
Admission date: 2024  Discharge date: 24    Referring Provider: Denys Rojas MD    Admission diagnosis:   premature rupture of membranes (PPROM) with unknown onset of labor [O42.919]       premature rupture of membranes (PPROM) with unknown onset of labor       Discharge diagnosis:  33-year-old -0-1-2 at 22 weeks 1 day gestation   premature rupture membranes  No evidence of labor or chorioamnionitis  4   insulin requiring type 2 diabetes mellitus  5.   Status post steroids for fetal lung maturity  and     Consultants: Maternal-fetal medicine and neonatology      Hospital course:      Patient 33-year-old female -0-1-2 at 21 weeks 6 days, who presents after being seen at .  She is a type 2 diabetes followed by Dr. Julian phillips for diabetes management.  She reported that she had been having some cramping and vaginal leaking for the last week.  He was seen by Dr. Julian phillips noted to have very little fluid suspicious of pre-PROM.  She also appeared slightly tender along uterus.  She is Icelandic-speaking only.  She was admitted to antepartum unit labs, and given  antibiotics.  Patient seen by neonatology and PDC.  PDC ultrasound on , breech, size equal dates GERARDO 1.7 cm antral placenta normal cervical length.  Normal kidneys bladder and stomach visualized.  She received 40 hours of IV latency antibiotics and received steroids for fetal lung maturity.  After discussion with MFM and neonatology patient be discharged to home on 5 days of amoxicillin, with close follow-up twice weekly with MFM on hospital day 3 after no signs of labor or chorioamnionitis.  Questions answered with .        Vitals:    24 0759   BP: 115/70   Pulse: 88   Resp: 18   Temp: 98.1 °F (36.7 °C)   SpO2:        GENERAL:  Well-developed, well-nourished in no acute distress.   ABD:  Gravid uterus nontender    FHT's 140  EXTREMITIES:  No clubbing, cyanosis or  edema.  PSYCHIATRIC:  Normal affect and mood.      Discharge condition: stable  Discharge diet   Dietary Orders (From admission, onward)       Start     Ordered    08/29/24 2206  Diet: Regular/House; Fluid Consistency: Thin (IDDSI 0)  Diet Effective Now        References:    Diet Order Crosswalk   Question Answer Comment   Diets: Regular/House    Fluid Consistency: Thin (IDDSI 0)        08/29/24 2206                  Additional Instructions: Call with fevers, uncontrolled nausea/vomiting/pain.  Medications:      Discharge Medications        New Medications        Instructions Start Date   amoxicillin 500 MG capsule  Commonly known as: AMOXIL   500 mg, Oral, Every 8 Hours      polyethylene glycol 17 g packet  Commonly known as: MIRALAX   17 g, Oral, Daily             Continue These Medications        Instructions Start Date   aspirin 81 MG chewable tablet   81 mg, Oral, Daily      insulin glargine 100 UNIT/ML injection  Commonly known as: LANTUS, SEMGLEE   100 Units, Subcutaneous, 2 Times Daily      Prenatal 27-1 27-1 MG tablet tablet   1 tablet, Oral, Daily             Disposition: home   Follow up:   Future Appointments   Date Time Provider Department Center   9/3/2024 11:30 AM [facility] US 1  [facility]  [facility]   9/3/2024 11:30 AM  [facility] NEW PATIENT [facility] None       I spent over 30 minutes on discharge activity which included: face-to-face encounter counseling with the patient, reviewing the data in the system, coordination of the care with the nursing staff as well as consultants, documentation, and entering orders.      Filiberto Culver,   12:56 EDT

## 2024-08-31 NOTE — NURSING NOTE
Discussed discharge care with patient,  with Dr Culver and  present. Pt aware to take oral antibiotics Amoxicillin as prescribed until completed (three times a day). Will take first dose this evening. (Last IV antibiotic given at 1547 today.). Discussed S/S of infection. Pinkish clear fluid noted on pad. No fever or abd tenderness. FHT-135. Second dose of Betamethsone 12 mg given IM at 1547 in left VL. Pt states she has enough Humalog, Lantus and syringes until next appointment. Box of alcohol swabs given.    Left 2 phone messages to on-call  regarding pt need of transportation to appointments at [facility] starting on Tuesday, 9/3 at 11:30 and then every Tuesday and Thursday thereafter. Left email for Ayla MORSE to continue to follow with transportation concerns (one car and  works) and Mariel Webster for consideration of [facility]. Due to the Labor Day holiday, plan will be for  to bring and drop pt off for her appt on Tuesday with [facility] providing transportation back to home in [place], until future transportation means can be arranged.  Pt and  verbalized understanding to all of above care. Ochoa RNC-OB, MSN  BSN

## 2024-08-31 NOTE — CONSULTS
Prenatal Consult    Referring OB:  Dr. Polanco  Principal Problem: Previable premature rupture of membranes at at least 20 weeks of pregnancy, currently 22 0/7 weeks gestation with estimated fetal weight of 440 grams () and oligohydramnios (GERARDO 1.7).  Pregnancy also complicated by maternal Type 2 DM.      Reason for Consultation:  previable PPROM    Maternal History:   First last name is a 33 y.o. yr/o  with:  rupture of membranes.  The  EDC is  Estimated Date of Delivery: Estimated Date of Delivery: 1/3/25    Consult:  father, mother, and   available for discussion.  We reviewed the fetal and  aspects of the above conditions to include: estimated survival rate, intraventricular hemorrhage, and chronic lung disease/pulmonary hypoplasia, as well as need for very specific medical care, treatment team, and equipment needed, given very early gestation. US today shows EFW of 440 grams and oligohydramnios with GERARDO of 1.7. Mother has concerns with her obstetric care at  and does not wish to delivery her son, , there.  We spoke at length on how 's best chance of survival would be if he was delivered at an institution with a level IV NICU (, [facility], or [place]) where he did not have to endure a transport from a different hospital (dramatically increasing his risk for IVH if delivered at outlying hospital) and were equipped with the staff and equipment he would need.  Mother and father both stated they understood he would be a complex patient to take care of at this point in gestation and would have a higher likelihood of survival if delivered at an institution with a higher level of NICU (level IV) care than we can offer at [facility] at this time.     We will plan to attend delivery when requested if patient delivers at [facility].      Plan for  resuscitation:  Parents would like to have a full resuscitation.  We discussed airway management and how this is what  would be offered at this early in gestation.  We also discussed the potential harm/pain chest compressions would cause without adding to his likelihood of survival.    Thank you for allowing us to participate in the care of your patient.     Elizabeth Childers MD  08/31/24  15:43 EDT      30 minutes were spent in consultation, greater than 90% face to face time.

## 2024-08-31 NOTE — PLAN OF CARE
Goal Outcome Evaluation:      Pt remains stable and afebrile, hopeful for discharge upon finishing IV antibiotics and IM steroids

## 2024-09-01 DIAGNOSIS — O42.919 PRETERM PREMATURE RUPTURE OF MEMBRANES (PPROM) WITH UNKNOWN ONSET OF LABOR: Primary | ICD-10-CM

## 2024-09-03 ENCOUNTER — DOCUMENTATION (OUTPATIENT)
Dept: SOCIAL WORK | Facility: HOSPITAL | Age: 34
End: 2024-09-03

## 2024-09-03 ENCOUNTER — OFFICE VISIT (OUTPATIENT)
Dept: OBSTETRICS AND GYNECOLOGY | Facility: HOSPITAL | Age: 34
End: 2024-09-03

## 2024-09-03 ENCOUNTER — PATIENT OUTREACH (OUTPATIENT)
Dept: LABOR AND DELIVERY | Facility: HOSPITAL | Age: 34
End: 2024-09-03

## 2024-09-03 ENCOUNTER — REFERRAL TRIAGE (OUTPATIENT)
Dept: LABOR AND DELIVERY | Facility: HOSPITAL | Age: 34
End: 2024-09-03

## 2024-09-03 ENCOUNTER — HOSPITAL ENCOUNTER (OUTPATIENT)
Dept: WOMENS IMAGING | Facility: HOSPITAL | Age: 34
Discharge: HOME | End: 2024-09-03
Admitting: OBSTETRICS & GYNECOLOGY

## 2024-09-03 VITALS
DIASTOLIC BLOOD PRESSURE: 71 MMHG | WEIGHT: 293 LBS | HEIGHT: 65 IN | BODY MASS INDEX: 48.82 KG/M2 | SYSTOLIC BLOOD PRESSURE: 114 MMHG

## 2024-09-03 DIAGNOSIS — O42.919 PRETERM PREMATURE RUPTURE OF MEMBRANES (PPROM) WITH UNKNOWN ONSET OF LABOR: ICD-10-CM

## 2024-09-03 DIAGNOSIS — O42.919 PRETERM PREMATURE RUPTURE OF MEMBRANES (PPROM) WITH UNKNOWN ONSET OF LABOR: Primary | ICD-10-CM

## 2024-09-03 PROCEDURE — 76815 OB US LIMITED FETUS(S): CPT

## 2024-09-03 NOTE — ASSESSMENT & PLAN NOTE
Patient without evidence of chorioamnionitis, labor or abruption today   We discussed checking temperature twice daily and coming to OB Triage for any evidence of fever, pain, bleeding   She continues to desire expectant management with planned admission at 24 - 26 weeks GA   She will follow up with MFM twice weekly

## 2024-09-03 NOTE — PROGRESS NOTES
"    Maternal/Fetal Medicine Follow Up Note     Name: Demetra Betancur    : 1990     MRN: [id number]     Referring Provider: Megha Polanco MD    Chief Complaint  PPROM, T2DM, Prev C/S x 1    Subjective     History of Present Illness:  Demetra Betancur is a 33 y.o.  22w4d who presents today for follow up in the setting of known previable PPROM   No fever, pain, VB.   Checking temp at home   Is having leaking clear fluid     HECTOR: Estimated Date of Delivery: 1/3/25     ROS:   As noted in HPI.     Objective     Vital Signs  /71   Ht 165.1 cm (65\")   Wt 133 kg (293 lb)   Estimated body mass index is 48.76 kg/m² as calculated from the following:    Height as of this encounter: 165.1 cm (65\").    Weight as of this encounter: 133 kg (293 lb).    Ultrasound Impression:   See viewpoint      Assessment and Plan     Demetra Betancur is a 33 y.o.  22w4d     Diagnoses and all orders for this visit:    1.  premature rupture of membranes (PPROM) with unknown onset of labor (Primary)  Assessment & Plan:  Patient without evidence of chorioamnionitis, labor or abruption today   We discussed checking temperature twice daily and coming to OB Triage for any evidence of fever, pain, bleeding   She continues to desire expectant management with planned admission at 24 - 26 weeks GA   She will follow up with MFM twice weekly              Follow Up  3 days     I spent 20 minutes caring for the patient on the day of service. This included: obtaining or reviewing a separately obtained medical history, reviewing patient records, performing a medically appropriate exam and/or evaluation, counseling or educating the patient/family/caregiver, ordering medications, labs, and/or procedures and documenting such in the medical record. This does not include time spent on review and interpretation of other tests such as fetal ultrasound or the performance of other procedures such as " amniocentesis or CVS.    Margarita Reynolds MD FACOG  Maternal Fetal Medicine, [facility]     2024

## 2024-09-03 NOTE — OUTREACH NOTE
[facility]  Enrollment    Current Estimated Gestational Age: 22w4d    Questions/Answers      Flowsheet Row Responses   Would like to participate? Yes   Date of Intake Visit 09/03/24            [facility]  Intake    Current Estimated Gestational Age: 22w4d    Intake Assessment      Flowsheet Row Responses   Best Method for Contacting Cell   Currently Employed No   Able to keep appointments as scheduled No  [Difficulty with transportation]   Do you have a dentist? No   Resources Presently Utilizing: None   Maternal Warning Signs Provided   Other: Provided   Comments [employer]   Other Education WIC Benefits, SNAP Benefits, Transportation Assistance, Other            Learning Assessment      Flowsheet Row Responses   Relationship Patient   Learner Name Demetra   Does the learner have any barriers to learning? Language   What is the preferred language of the learner for medical teaching? Mohawk   Is an  required? Yes   How does the learner prefer to learn new concepts? Pictures/Video, Reading              Tobacco, Alcohol, and Drug History     reports that she has never smoked. She has never been exposed to tobacco smoke. She has never used smokeless tobacco.   reports that she does not currently use alcohol.   reports that she does not currently use drugs.        [facility]  Check-In    Current Estimated Gestational Age: 22w4d      Questions/Answers      Flowsheet Row Responses   Best Method for Contacting Cell   Demographics Reviewed Yes   Currently Employed No   Able to keep appointments as scheduled No  [Difficulty with transportation]   Baby Active/Feeling Fetal Movemen Yes   How are you presently feeling? OK   Are you having any of the following symptoms? --  [Denies]   Questions regarding prenatal visits or tests to be ordered? No   Equipment presently used at home: Glucometer   Education related to new diagnoses/home equipment No   Supplies ready for baby Crib    Resource/Environmental Concerns Financial, Reliable Transportation   Do you have any questions related to your care experience, your pregnancy, plans for delivery, any concerns, etc? No   Other Education WIC Benefits, SNAP Benefits, Transportation Assistance, Other            Demetra is seeing Dr. Polanco for her prenatal care. Her history is significant for Type 2 DM, she has had a , and gall bladder surgery.    She has experienced trauma in her life a couple of years ago which left her with depression for which she received counseling at that time. No issues since then.  She has no SI/HI and currently feels safe. Discussed that we will be screening periodically for  and postpartum changes with mood looking for trends which may need to be addressed. VU.    She is feeling well, reports good fetal movement. She has begun gathering items she will need for baby care. She is nervous about labor, birth and caring for a .      [facility] reviewed with patient. She does not have insurance. She struggles with transportation as her  works during the day when she needs to go to appointments. She relays that she is afraid to take the bus since she doesn't speak or read English she can't navigate it safely. She has been unable to pay for her insulin due to very high cost. Collaborated with MARY Pratt for assistance with transportation home today and to assist with getting some medication until she can get her prescription transferred to the pharmacy she has found where her medication will be less expensive.    Contact information provided. Encouraged to call with questions, concerns, or for support. Will plan f/u tomorrow for transportation assistance.    LIBRADO Alvarez RN  Maternity Nurse Navigator    9/3/2024, 17:11 EDT

## 2024-09-05 ENCOUNTER — PATIENT OUTREACH (OUTPATIENT)
Dept: LABOR AND DELIVERY | Facility: HOSPITAL | Age: 34
End: 2024-09-05

## 2024-09-05 NOTE — OUTREACH NOTE
[facility]  Check-In    Current Estimated Gestational Age: 22w6d      Questions/Answers      Flowsheet Row Responses   Best Method for Contacting Cell   Demographics Reviewed Yes   Currently Employed No   How are you presently feeling? Good   Are you having any of the following symptoms? --  [Denies any issues]   Resource/Environmental Concerns Financial, Reliable Transportation   Do you have any questions related to your care experience, your pregnancy, plans for delivery, any concerns, etc? No            [facility] Line  #[id number] utilized today. Demetra has transportation arranged for her appointment tomorrow. Discussed that I will bring her information about bus schedules and routes tomorrow to help her get to appointments in the future.    LIBRADO Alvarez RN  Maternity Nurse Navigator    9/5/2024, 16:52 EDT

## 2024-09-06 ENCOUNTER — OFFICE VISIT (OUTPATIENT)
Dept: OBSTETRICS AND GYNECOLOGY | Facility: HOSPITAL | Age: 34
End: 2024-09-06

## 2024-09-06 ENCOUNTER — HOSPITAL ENCOUNTER (OUTPATIENT)
Dept: WOMENS IMAGING | Facility: HOSPITAL | Age: 34
Discharge: HOME | End: 2024-09-06
Admitting: OBSTETRICS & GYNECOLOGY

## 2024-09-06 DIAGNOSIS — O42.919 PRETERM PREMATURE RUPTURE OF MEMBRANES (PPROM) WITH UNKNOWN ONSET OF LABOR: ICD-10-CM

## 2024-09-06 DIAGNOSIS — O42.919 PRETERM PREMATURE RUPTURE OF MEMBRANES (PPROM) WITH UNKNOWN ONSET OF LABOR: Primary | ICD-10-CM

## 2024-09-06 PROCEDURE — 76815 OB US LIMITED FETUS(S): CPT

## 2024-09-06 NOTE — PROGRESS NOTES
"    Maternal/Fetal Medicine Follow Up Note     Name: Demetra Betancur    : 1990     MRN: [id number]     Referring Provider: MFM Prenatal     Chief Complaint  PPROM     Subjective     History of Present Illness:  Demetra Betancur is a 33 y.o.  23w0d who presents today for follow up in the setting of known PPROM   No interval issues   No fevers.   No VB though is having leaking of fluid   No pain     HECTOR: Estimated Date of Delivery: 1/3/25     ROS:   As noted in HPI.     Objective     Vital Signs  There were no vitals taken for this visit.  Estimated body mass index is 48.76 kg/m² as calculated from the following:    Height as of 9/3/24: 165.1 cm (65\").    Weight as of 9/3/24: 133 kg (293 lb).    NAD   Abdomen gravid, nontender     Ultrasound Impression:   See viewpoint      Assessment and Plan     Demetra Betancur is a 33 y.o.  23w0d     Diagnoses and all orders for this visit:    1.  premature rupture of membranes (PPROM) with unknown onset of labor (Primary)  -     [facility]  Diagn[facility]; Future     Precautions reviewed   Follow up twice weekly until admission at 24 - 26 weeks GA     Follow Up  Twice weekly     I spent 10 minutes caring for the patient on the day of service. This included: obtaining or reviewing a separately obtained medical history, reviewing patient records, performing a medically appropriate exam and/or evaluation, counseling or educating the patient/family/caregiver, ordering medications, labs, and/or procedures and documenting such in the medical record. This does not include time spent on review and interpretation of other tests such as fetal ultrasound or the performance of other procedures such as amniocentesis or CVS.    Margarita Reynolds MD FACOG  Maternal Fetal Medicine, [facility]    Diagnostic [place]     2024  "

## 2024-09-06 NOTE — ASSESSMENT & PLAN NOTE
No evidence of chorio, abruption or labor currently   Reviewed precautions   Plan follow up twice weekly until admission at 24 - 26 weeks GA

## 2024-09-09 DIAGNOSIS — O42.919 PRETERM PREMATURE RUPTURE OF MEMBRANES (PPROM) WITH UNKNOWN ONSET OF LABOR: Primary | ICD-10-CM

## 2024-09-10 ENCOUNTER — PATIENT OUTREACH (OUTPATIENT)
Dept: LABOR AND DELIVERY | Facility: HOSPITAL | Age: 34
End: 2024-09-10

## 2024-09-10 ENCOUNTER — OFFICE VISIT (OUTPATIENT)
Dept: OBSTETRICS AND GYNECOLOGY | Facility: HOSPITAL | Age: 34
End: 2024-09-10

## 2024-09-10 ENCOUNTER — HOSPITAL ENCOUNTER (OUTPATIENT)
Dept: WOMENS IMAGING | Facility: HOSPITAL | Age: 34
Discharge: HOME | End: 2024-09-10
Admitting: OBSTETRICS & GYNECOLOGY

## 2024-09-10 VITALS — WEIGHT: 293 LBS | BODY MASS INDEX: 49.42 KG/M2 | DIASTOLIC BLOOD PRESSURE: 63 MMHG | SYSTOLIC BLOOD PRESSURE: 116 MMHG

## 2024-09-10 DIAGNOSIS — O42.919 PRETERM PREMATURE RUPTURE OF MEMBRANES (PPROM) WITH UNKNOWN ONSET OF LABOR: ICD-10-CM

## 2024-09-10 DIAGNOSIS — E11.9 TYPE 2 DIABETES MELLITUS WITHOUT COMPLICATION, WITH LONG-TERM CURRENT USE OF INSULIN: Primary | ICD-10-CM

## 2024-09-10 DIAGNOSIS — Z79.4 TYPE 2 DIABETES MELLITUS WITHOUT COMPLICATION, WITH LONG-TERM CURRENT USE OF INSULIN: Primary | ICD-10-CM

## 2024-09-10 LAB — GLUCOSE BLDC GLUCOMTR-MCNC: 173 MG/DL (ref 70–130)

## 2024-09-10 PROCEDURE — 76815 OB US LIMITED FETUS(S): CPT

## 2024-09-10 PROCEDURE — 76815 OB US LIMITED FETUS(S): CPT | Performed by: OBSTETRICS & GYNECOLOGY

## 2024-09-10 NOTE — ASSESSMENT & PLAN NOTE
No evidence of chorio, abruption or labor currently   Reviewed precautions   Plan follow up twice weekly until admission at 24 - 26 weeks GA   GERARDO of 1.8 today.

## 2024-09-10 NOTE — PROGRESS NOTES
Darius # [id number]  Patient is still leaking fluid, no bleeding, or contractions  NIPT not done  Finger stick 173  Patient has not been seen at  diabetic clinic for 2 weeks, patients dexcom sensor  yesterday and she goes to  clinic to get her sensors.  Instructed patient to check her blood sugars 4 times at home with her glucometer.  Patient voices understanding.

## 2024-09-10 NOTE — OUTREACH NOTE
[facility]  Check-In    Current Estimated Gestational Age: 23w4d      Questions/Answers      Flowsheet Row Responses   Best Method for Contacting Cell   Demographics Reviewed No   Currently Employed No   Gender(s) and Name(s) Boy-Kam   Baby Active/Feeling Fetal Movemen Yes   How are you presently feeling? Good   Are you having any of the following symptoms? Other   Other Symptoms: Does still leak at times   Questions regarding prenatal visits or tests to be ordered? No   Equipment presently used at home: Glucometer, Other   Other Equipment: Dexcom   Education related to new diagnoses/home equipment No   Resource/Environmental Concerns Financial, Reliable Transportation   Do you have any questions related to your care experience, your pregnancy, plans for delivery, any concerns, etc? No   Other Education Insurance benefits/Incentives            Demetra states she tried to call [employer] but she could not reach any one and she also got a call about her bill. She was told she can pay $20 towards it so she intends to try to do that. Informed someone from [employer] should be calling her to see about helping her with insurance.     LIBRADO Alvarez RN  Maternity Nurse Navigator    9/10/2024, 14:50 EDT

## 2024-09-10 NOTE — ASSESSMENT & PLAN NOTE
Pt to be seen on Thursday at  Diabetes clinic given resources there for supplies. Fingerstick 173.

## 2024-09-10 NOTE — PROGRESS NOTES
"    Maternal/Fetal Medicine Follow Up Note     Name: Demetra Betancur    : 1990     MRN: [id number]     Referring Provider: MFM Prenatal     Chief Complaint  PPROM     Subjective     History of Present Illness:  Demetra Betancur is a 33 y.o.  23w4d who presents today for follow up in the setting of known PPROM   No interval issues   No fevers.   No VB though is having leaking of fluid sometimes pink tinged.  Some pain in her hip but no cramping.     HECTOR: Estimated Date of Delivery: 1/3/25     ROS:   As noted in HPI.     Objective     Vital Signs  /63   Wt 135 kg (297 lb)   Estimated body mass index is 49.42 kg/m² as calculated from the following:    Height as of 9/3/24: 165.1 cm (65\").    Weight as of this encounter: 135 kg (297 lb).    NAD   Abdomen gravid, nontender     Ultrasound Impression:   See viewpoint      Assessment and Plan     Demetra Betancur is a 33 y.o.  23w0d     Diagnoses and all orders for this visit:    1. Type 2 diabetes mellitus without complication, with long-term current use of insulin (Primary)  Assessment & Plan:  Pt to be seen on Thursday at  Diabetes clinic given resources there for supplies. Fingerstick 173.     Orders:  -     POC Glucose Fingerstick    2.  premature rupture of membranes (PPROM) with unknown onset of labor  Assessment & Plan:  No evidence of chorio, abruption or labor currently   Reviewed precautions   Plan follow up twice weekly until admission at 24 - 26 weeks GA   GERARDO of 1.8 today.          Precautions reviewed   Follow up twice weekly until admission at 24 - 26 weeks GA     Follow Up  Twice weekly     I spent 10 minutes caring for the patient on the day of service. This included: obtaining or reviewing a separately obtained medical history, reviewing patient records, performing a medically appropriate exam and/or evaluation, counseling or educating the patient/family/caregiver, ordering medications, labs, and/or " procedures and documenting such in the medical record. This does not include time spent on review and interpretation of other tests such as fetal ultrasound or the performance of other procedures such as amniocentesis or CVS.    Margarita Reynolds MD FACOG  Maternal Fetal Medicine, [facility]     2024

## 2024-09-13 ENCOUNTER — OFFICE VISIT (OUTPATIENT)
Dept: OBSTETRICS AND GYNECOLOGY | Facility: HOSPITAL | Age: 34
End: 2024-09-13

## 2024-09-13 ENCOUNTER — HOSPITAL ENCOUNTER (OUTPATIENT)
Dept: WOMENS IMAGING | Facility: HOSPITAL | Age: 34
Discharge: HOME | End: 2024-09-13
Admitting: OBSTETRICS & GYNECOLOGY

## 2024-09-13 DIAGNOSIS — O42.919 PRETERM PREMATURE RUPTURE OF MEMBRANES (PPROM) WITH UNKNOWN ONSET OF LABOR: ICD-10-CM

## 2024-09-13 DIAGNOSIS — Z79.4 TYPE 2 DIABETES MELLITUS WITHOUT COMPLICATION, WITH LONG-TERM CURRENT USE OF INSULIN: ICD-10-CM

## 2024-09-13 DIAGNOSIS — Z3A.24 24 WEEKS GESTATION OF PREGNANCY: ICD-10-CM

## 2024-09-13 DIAGNOSIS — O42.919 PRETERM PREMATURE RUPTURE OF MEMBRANES (PPROM) WITH UNKNOWN ONSET OF LABOR: Primary | ICD-10-CM

## 2024-09-13 DIAGNOSIS — E11.9 TYPE 2 DIABETES MELLITUS WITHOUT COMPLICATION, WITH LONG-TERM CURRENT USE OF INSULIN: ICD-10-CM

## 2024-09-13 PROCEDURE — 76815 OB US LIMITED FETUS(S): CPT

## 2024-09-13 NOTE — ASSESSMENT & PLAN NOTE
Stable. No evidence of chorio, abruption, or  labor.   Reviewed strict precautions.   Discussed trying to stay outpatient until 26wks and patient would prefer to do that.   Plan is for twice weekly visits on Tues/Friday until admission at approximately 26wks for inpatient management until delivery.   Anhydramnios today.

## 2024-09-13 NOTE — ASSESSMENT & PLAN NOTE
Pt was seen at [facility] yesterday and adjustments were made to insulin dosing. We will continue to follow closely.

## 2024-09-16 ENCOUNTER — HOSPITAL ENCOUNTER (INPATIENT)
Facility: HOSPITAL | Age: 34
LOS: 1 days | Discharge: HOME | End: 2024-09-17
Attending: OBSTETRICS & GYNECOLOGY | Admitting: OBSTETRICS & GYNECOLOGY

## 2024-09-16 LAB
BACTERIA UR QL AUTO: ABNORMAL /HPF
BILIRUB UR QL STRIP: NEGATIVE
CLARITY UR: ABNORMAL
COLOR UR: YELLOW
GLUCOSE UR STRIP-MCNC: NEGATIVE MG/DL
HGB UR QL STRIP.AUTO: ABNORMAL
HYALINE CASTS UR QL AUTO: ABNORMAL /LPF
KETONES UR QL STRIP: ABNORMAL
LEUKOCYTE ESTERASE UR QL STRIP.AUTO: ABNORMAL
NITRITE UR QL STRIP: NEGATIVE
PH UR STRIP.AUTO: 5.5 [PH] (ref 5–8)
PROT UR QL STRIP: ABNORMAL
RBC # UR STRIP: ABNORMAL /HPF
REF LAB TEST METHOD: ABNORMAL
SP GR UR STRIP: 1.03 (ref 1–1.03)
SQUAMOUS #/AREA URNS HPF: ABNORMAL /HPF
UROBILINOGEN UR QL STRIP: ABNORMAL
WBC # UR STRIP: ABNORMAL /HPF

## 2024-09-16 PROCEDURE — 86901 BLOOD TYPING SEROLOGIC RH(D): CPT | Performed by: OBSTETRICS & GYNECOLOGY

## 2024-09-16 PROCEDURE — 86780 TREPONEMA PALLIDUM: CPT | Performed by: OBSTETRICS & GYNECOLOGY

## 2024-09-16 PROCEDURE — 86850 RBC ANTIBODY SCREEN: CPT | Performed by: OBSTETRICS & GYNECOLOGY

## 2024-09-16 PROCEDURE — 86900 BLOOD TYPING SEROLOGIC ABO: CPT | Performed by: OBSTETRICS & GYNECOLOGY

## 2024-09-16 PROCEDURE — 85027 COMPLETE CBC AUTOMATED: CPT | Performed by: OBSTETRICS & GYNECOLOGY

## 2024-09-16 PROCEDURE — 81001 URINALYSIS AUTO W/SCOPE: CPT | Performed by: OBSTETRICS & GYNECOLOGY

## 2024-09-16 RX ORDER — DOCUSATE SODIUM 100 MG/1
100 CAPSULE, LIQUID FILLED ORAL 2 TIMES DAILY PRN
Status: DISCONTINUED | OUTPATIENT
Start: 2024-09-16 | End: 2024-09-17 | Stop reason: HOSPADM

## 2024-09-16 RX ORDER — LIDOCAINE HYDROCHLORIDE 10 MG/ML
0.5 INJECTION, SOLUTION EPIDURAL; INFILTRATION; INTRACAUDAL; PERINEURAL ONCE AS NEEDED
Status: DISCONTINUED | OUTPATIENT
Start: 2024-09-16 | End: 2024-09-17 | Stop reason: HOSPADM

## 2024-09-16 RX ORDER — BISACODYL 10 MG
10 SUPPOSITORY, RECTAL RECTAL DAILY PRN
Status: DISCONTINUED | OUTPATIENT
Start: 2024-09-16 | End: 2024-09-17 | Stop reason: HOSPADM

## 2024-09-16 RX ORDER — ACETAMINOPHEN 325 MG/1
650 TABLET ORAL EVERY 4 HOURS PRN
Status: DISCONTINUED | OUTPATIENT
Start: 2024-09-16 | End: 2024-09-17 | Stop reason: HOSPADM

## 2024-09-16 RX ORDER — SODIUM CHLORIDE 9 MG/ML
40 INJECTION, SOLUTION INTRAVENOUS AS NEEDED
Status: DISCONTINUED | OUTPATIENT
Start: 2024-09-16 | End: 2024-09-17 | Stop reason: HOSPADM

## 2024-09-16 RX ORDER — DEXTROSE MONOHYDRATE AND SODIUM CHLORIDE 5; .9 G/100ML; G/100ML
75 INJECTION, SOLUTION INTRAVENOUS CONTINUOUS
Status: DISCONTINUED | OUTPATIENT
Start: 2024-09-17 | End: 2024-09-17 | Stop reason: HOSPADM

## 2024-09-16 RX ORDER — ONDANSETRON 2 MG/ML
4 INJECTION INTRAMUSCULAR; INTRAVENOUS EVERY 8 HOURS PRN
Status: DISCONTINUED | OUTPATIENT
Start: 2024-09-16 | End: 2024-09-17 | Stop reason: HOSPADM

## 2024-09-16 RX ORDER — SODIUM CHLORIDE 0.9 % (FLUSH) 0.9 %
10 SYRINGE (ML) INJECTION AS NEEDED
Status: DISCONTINUED | OUTPATIENT
Start: 2024-09-16 | End: 2024-09-17 | Stop reason: HOSPADM

## 2024-09-16 RX ORDER — ONDANSETRON 4 MG/1
8 TABLET, ORALLY DISINTEGRATING ORAL EVERY 8 HOURS PRN
Status: DISCONTINUED | OUTPATIENT
Start: 2024-09-16 | End: 2024-09-17 | Stop reason: HOSPADM

## 2024-09-16 RX ORDER — SODIUM CHLORIDE 0.9 % (FLUSH) 0.9 %
10 SYRINGE (ML) INJECTION EVERY 12 HOURS SCHEDULED
Status: DISCONTINUED | OUTPATIENT
Start: 2024-09-17 | End: 2024-09-17 | Stop reason: HOSPADM

## 2024-09-17 ENCOUNTER — APPOINTMENT (OUTPATIENT)
Dept: WOMENS IMAGING | Facility: HOSPITAL | Age: 34
End: 2024-09-17

## 2024-09-17 VITALS
DIASTOLIC BLOOD PRESSURE: 69 MMHG | HEART RATE: 83 BPM | RESPIRATION RATE: 16 BRPM | SYSTOLIC BLOOD PRESSURE: 126 MMHG | TEMPERATURE: 98.2 F

## 2024-09-17 LAB
ABO GROUP BLD: NORMAL
BLD GP AB SCN SERPL QL: NEGATIVE
D-LACTATE SERPL-SCNC: 1.9 MMOL/L (ref 0.5–2)
DEPRECATED RDW RBC AUTO: 43.2 FL (ref 37–54)
ERYTHROCYTE [DISTWIDTH] IN BLOOD BY AUTOMATED COUNT: 13.3 % (ref 12.3–15.4)
GLUCOSE BLDC GLUCOMTR-MCNC: 126 MG/DL (ref 70–130)
GLUCOSE BLDC GLUCOMTR-MCNC: 196 MG/DL (ref 70–130)
GLUCOSE BLDC GLUCOMTR-MCNC: 94 MG/DL (ref 70–130)
HCT VFR BLD AUTO: 36.3 % (ref 34–46.6)
HGB BLD-MCNC: 12.3 G/DL (ref 12–15.9)
MCH RBC QN AUTO: 30.1 PG (ref 26.6–33)
MCHC RBC AUTO-ENTMCNC: 33.9 G/DL (ref 31.5–35.7)
MCV RBC AUTO: 88.8 FL (ref 79–97)
PLATELET # BLD AUTO: 202 10*3/MM3 (ref 140–450)
PMV BLD AUTO: 9.8 FL (ref 6–12)
RBC # BLD AUTO: 4.09 10*6/MM3 (ref 3.77–5.28)
RH BLD: POSITIVE
T&S EXPIRATION DATE: NORMAL
TREPONEMA PALLIDUM IGG+IGM AB [PRESENCE] IN SERUM OR PLASMA BY IMMUNOASSAY: NORMAL
WBC NRBC COR # BLD AUTO: 9.48 10*3/MM3 (ref 3.4–10.8)

## 2024-09-17 PROCEDURE — 76820 UMBILICAL ARTERY ECHO: CPT | Performed by: OBSTETRICS & GYNECOLOGY

## 2024-09-17 PROCEDURE — 83605 ASSAY OF LACTIC ACID: CPT | Performed by: OBSTETRICS & GYNECOLOGY

## 2024-09-17 PROCEDURE — 25010000002 CEFTRIAXONE PER 250 MG: Performed by: OBSTETRICS & GYNECOLOGY

## 2024-09-17 PROCEDURE — 76815 OB US LIMITED FETUS(S): CPT

## 2024-09-17 PROCEDURE — 99239 HOSP IP/OBS DSCHRG MGMT >30: CPT | Performed by: OBSTETRICS & GYNECOLOGY

## 2024-09-17 PROCEDURE — 76815 OB US LIMITED FETUS(S): CPT | Performed by: OBSTETRICS & GYNECOLOGY

## 2024-09-17 PROCEDURE — 25810000003 DEXTROSE 5 % AND SODIUM CHLORIDE 0.9 % 5-0.9 % SOLUTION: Performed by: OBSTETRICS & GYNECOLOGY

## 2024-09-17 PROCEDURE — 82948 REAGENT STRIP/BLOOD GLUCOSE: CPT

## 2024-09-17 PROCEDURE — 63710000001 INSULIN GLARGINE PER 5 UNITS: Performed by: OBSTETRICS & GYNECOLOGY

## 2024-09-17 PROCEDURE — 87040 BLOOD CULTURE FOR BACTERIA: CPT | Performed by: OBSTETRICS & GYNECOLOGY

## 2024-09-17 RX ORDER — LABETALOL HYDROCHLORIDE 5 MG/ML
INJECTION, SOLUTION INTRAVENOUS
Status: DISCONTINUED
Start: 2024-09-17 | End: 2024-09-17 | Stop reason: HOSPADM

## 2024-09-17 RX ORDER — CEPHALEXIN 500 MG/1
500 CAPSULE ORAL EVERY 6 HOURS SCHEDULED
Qty: 28 CAPSULE | Refills: 0 | Status: SHIPPED | OUTPATIENT
Start: 2024-09-17 | End: 2024-09-24

## 2024-09-17 RX ORDER — NIFEDIPINE 10 MG/1
CAPSULE ORAL
Status: DISCONTINUED
Start: 2024-09-17 | End: 2024-09-17 | Stop reason: HOSPADM

## 2024-09-17 RX ADMIN — DEXTROSE AND SODIUM CHLORIDE 75 ML/HR: 5; 900 INJECTION, SOLUTION INTRAVENOUS at 02:35

## 2024-09-17 RX ADMIN — SODIUM CHLORIDE 2000 MG: 900 INJECTION INTRAVENOUS at 00:45

## 2024-09-17 RX ADMIN — DEXTROSE AND SODIUM CHLORIDE 75 ML/HR: 5; 900 INJECTION, SOLUTION INTRAVENOUS at 00:28

## 2024-09-17 RX ADMIN — Medication 10 ML: at 00:00

## 2024-09-17 RX ADMIN — INSULIN GLARGINE 90 UNITS: 100 INJECTION, SOLUTION SUBCUTANEOUS at 00:45

## 2024-09-17 RX ADMIN — SODIUM CHLORIDE 40 ML: 900 INJECTION INTRAVENOUS at 00:44

## 2024-09-17 NOTE — H&P
Triage H&P    Patient Name: Demetra Betancur  : 1990  MRN: [id number]  Date of Service: 2024  Referring Provider: Megha Polanco     ID: 33 y.o.  at 24w3d    Chief complaint: jordyn every 30 minutes, pink tinged discharge     HPI:  Pt with persistent feelings of contractions once every 30 minutes today with pink watery discharge.  Denies fever.  Denies constant abdominal pain.  Does not feel unwell.  Excellent fetal movement    Pregnancy course significant for:      Her prenatal care is complicated by  diabetes  pre-gestational and Diabetes has been  sub-optimally controlled. and abnormal amniotic fluid  oligohydramnios. From Prolonged  premature rupture of membranes at 19 weeks. Her previous obstetric/gynecological history is noted for  term vaginal delivery and term PCS .    The following portions of the patients history were reviewed and updated as appropriate: current medications, allergies, past medical history, past surgical history, past family history, past social history, and problem list.       REVIEW OF SYSTEMS  regular contractions  and leaking fluid from the vagina  Patient reports good fetal movement.  She denies any vaginal bleeding, leakage of fluid, or contractions.  She denies headache, visual changes, or right upper quadrant pain.  All other systems were reviewed and were negative.    Prenatal Labs  Lab Results   Component Value Date    HGB 11.5 (L) 2024    HEPBSAG Negative 2024    ABO B 2024    RH Positive 2024    ABSCRN Negative 2024    ZWV3OPJ1 Non Reactive 2024       OB HISTORY    OB History          4    Para   2    Term   2            AB   1    Living   2         SAB   1    IAB        Ectopic        Molar        Multiple        Live Births              Obstetric Comments   FOB #1 Pregnancy 1&2  FOB #2 Pregnancy 3&4             PAST MEDICAL HISTORY    Past Medical History:   Diagnosis Date    DM type 2  (diabetes mellitus, type 2)      PAST SURGICAL HISTORY     has a past surgical history that includes  section and Cholecystectomy.  CURRENT MEDICATIONS    No current facility-administered medications on file prior to encounter.     Current Outpatient Medications on File Prior to Encounter   Medication Sig Dispense Refill    amoxicillin (AMOXIL) 500 MG capsule Take 1 capsule by mouth Every Eight Hours. 15 capsule 0    aspirin 81 MG chewable tablet Chew 1 tablet Daily.      insulin glargine (LANTUS, SEMGLEE) 100 UNIT/ML injection Inject 100 Units under the skin into the appropriate area as directed 2 (Two) Times a Day. 10 mL 12    polyethylene glycol (MIRALAX) 17 GM/SCOOP powder Take 17 g (one capful) by mouth Daily. 238 g 2    Prenatal Vit-Fe Fumarate-FA (Prenatal 27-) 27-1 MG tablet tablet Take 1 tablet by mouth Daily.       ALLERGIES    Patient has no known allergies.  SOCIAL HISTORY    Social History     Tobacco Use   Smoking Status Never    Passive exposure: Never   Smokeless Tobacco Never     Social History     Substance and Sexual Activity   Alcohol Use Not Currently     Social History     Substance and Sexual Activity   Drug Use Not Currently     FAMILY HISTORY  The patient has a family history of    PHYSICAL EXAMINATION    Vital Signs Range for the last 24 hours  Temperature: Temp:  [97.8 °F (36.6 °C)] 97.8 °F (36.6 °C)   Temp Source:     BP:     Pulse:     Respirations: Resp:  [16] 16   Weight:       Constitutional:  Well developed, well nourished, no acute distress, well-groomed.  HEENT: Grossly normal.  Respiratory: unlabored  normal breath sounds.   Cardiovascular:  Normal rate and rhythm  Abdomen:  Soft, gravid, nontender.  Uterus: Soft, nontender. Fundus appropriate for dates.  Neurologic:  Alert & oriented x 4,  no focal deficits noted.   Psychiatric:  Speech and behavior appropriate.   Extremities: no cyanosis, clubbing or edema, no evidence of DVT.      External Fetal Monitoring:  FHT 140s    Actively moving  Unable to complete NST due to pt's habitus and fetal movement    Cervix: Exam by:  deferred   Dilation:     Effacement:     Station:           Labs:      Lab Results (last 24 hours)       Procedure Component Value Units Date/Time    Urinalysis With Culture If Indicated - Urine, Clean Catch [[id number]]  (Abnormal) Collected: 24    Specimen: Urine, Clean Catch Updated: 24     Color, UA Yellow     Appearance, UA Cloudy     pH, UA 5.5     Specific Gravity, UA 1.034     Glucose, UA Negative     Ketones, UA Trace     Bilirubin, UA Negative     Blood, UA Moderate (2+)     Protein, UA Trace     Leuk Esterase, UA Trace     Nitrite, UA Negative     Urobilinogen, UA 0.2 E.U./dL    Narrative:      In absence of clinical symptoms, the presence of pyuria, bacteria, and/or nitrites on the urinalysis result does not correlate with infection.    Urinalysis, Microscopic Only - Urine, Clean Catch [[id number]]  (Abnormal) Collected: 24    Specimen: Urine, Clean Catch Updated: 24     RBC, UA 11-20 /HPF      WBC, UA 3-5 /HPF      Comment: Urine culture not indicated.        Bacteria, UA None Seen /HPF      Squamous Epithelial Cells, UA 13-20 /HPF      Hyaline Casts, UA 0-6 /LPF      Methodology Automated Microscopy            ASSESSMENT/PLAN:  33 y.o. female  at 24w3d with  premature rupture of membranes (PPROM) with unknown onset of labor. At 19 weeks.    Type 2 DM on Lantus 100 units q12h  NPO for now with D5NS IVFH  Rocephin for possible UTI, culture pending  Admit for PDC U/S and evaluation in am  No current signs of chorio or abruption     contractions Q30 min and pt does not appear uncomfortable      NST is not possible at this time due to pt's habitus, low number of nurses here tonight - but fetal movement is audible     Approximately 25 minutes minutes floor time was spent in care of this patient, of which greater than 50% was spent in face-to-face  consultation and coordination of care.    Deyanira Georges MD  9/16/2024  23:51 EDT

## 2024-09-17 NOTE — PLAN OF CARE
Problem: Adult Inpatient Plan of Care  Goal: Plan of Care Review  Outcome: Ongoing, Progressing  Goal: Patient-Specific Goal (Individualized)  Outcome: Ongoing, Progressing  Goal: Absence of Hospital-Acquired Illness or Injury  Outcome: Ongoing, Progressing  Goal: Optimal Comfort and Wellbeing  Outcome: Ongoing, Progressing  Goal: Readiness for Transition of Care  Outcome: Ongoing, Progressing  Intervention: Mutually Develop Transition Plan  Recent Flowsheet Documentation  Taken 9/17/2024 0151 by Marylou Goff, RN  Equipment Currently Used at Home: glucometer     Problem: Prelabor Rupture of Membranes  Goal: Delayed Delivery with Absence of Infection  Outcome: Ongoing, Progressing     Problem: Maternal-Fetal Wellbeing  Goal: Optimal Maternal-Fetal Wellbeing  Outcome: Ongoing, Progressing   Goal Outcome Evaluation:

## 2024-09-17 NOTE — DISCHARGE SUMMARY
Discharge Summary    Patient Name: Demetra Betancur  : 1990  MRN: [id number]  Date of Service: 2024  Referring Provider: Megha Polanco  Discharge Provider: Margarita Reynolds MD    Date of Admission: 2024    Date of Discharge:  2024         Admission Diagnosis:  premature rupture of membranes (PPROM) with unknown onset of labor [O42.919]     Discharge Diagnosis:  same plus UTI plus folliculitis     Hospital Course:  Patient is a 33 y.o. female  at 24 weeks presented last night with lower abdominal pain. Was noted to have possible UTI on UA (culture pending). Not jordyn. No evidence of chorioamnionitis. No bleeding. FWB reassuring. Received single dose of antibiotics for presumed UTI. Ultrasound was stable this AM (long closed cervix), anhydramnios, jak breech. Exam this AM with nontender abdomen/fundus though patient reported labial swelling that, on exam, consistent with folliculitis. Plan to d/c home with Keflex for UTI/folliculitis, follow up with IRENE 2x weekly, continue monitoring for signs/sx of labor, chorio, abruption. Plans for admission at 26 weeks GA.     Discharge Condition: Stable    Discharge to: Home    Discharge Medications:     Keflex   All home meds       Follow up appointments: As scheduled with IRENE Reynolds MD  2024 10:26 EDT

## 2024-09-19 ENCOUNTER — HOSPITAL ENCOUNTER (OUTPATIENT)
Dept: WOMENS IMAGING | Facility: HOSPITAL | Age: 34
Discharge: HOME | End: 2024-09-19
Admitting: OBSTETRICS & GYNECOLOGY

## 2024-09-19 ENCOUNTER — PATIENT OUTREACH (OUTPATIENT)
Dept: LABOR AND DELIVERY | Facility: HOSPITAL | Age: 34
End: 2024-09-19

## 2024-09-19 ENCOUNTER — ROUTINE PRENATAL (OUTPATIENT)
Dept: OBSTETRICS AND GYNECOLOGY | Facility: HOSPITAL | Age: 34
End: 2024-09-19

## 2024-09-19 VITALS — BODY MASS INDEX: 49.92 KG/M2 | SYSTOLIC BLOOD PRESSURE: 136 MMHG | WEIGHT: 293 LBS | DIASTOLIC BLOOD PRESSURE: 63 MMHG

## 2024-09-19 DIAGNOSIS — O42.919 PRETERM PREMATURE RUPTURE OF MEMBRANES (PPROM) WITH UNKNOWN ONSET OF LABOR: ICD-10-CM

## 2024-09-19 DIAGNOSIS — Z79.4 TYPE 2 DIABETES MELLITUS WITHOUT COMPLICATION, WITH LONG-TERM CURRENT USE OF INSULIN: ICD-10-CM

## 2024-09-19 DIAGNOSIS — O42.919 PRETERM PREMATURE RUPTURE OF MEMBRANES (PPROM) WITH UNKNOWN ONSET OF LABOR: Primary | ICD-10-CM

## 2024-09-19 DIAGNOSIS — E11.9 TYPE 2 DIABETES MELLITUS WITHOUT COMPLICATION, WITH LONG-TERM CURRENT USE OF INSULIN: ICD-10-CM

## 2024-09-19 DIAGNOSIS — Z3A.24 24 WEEKS GESTATION OF PREGNANCY: ICD-10-CM

## 2024-09-19 PROCEDURE — 76815 OB US LIMITED FETUS(S): CPT

## 2024-09-22 LAB
BACTERIA SPEC AEROBE CULT: NORMAL
BACTERIA SPEC AEROBE CULT: NORMAL

## 2024-09-24 ENCOUNTER — ROUTINE PRENATAL (OUTPATIENT)
Dept: OBSTETRICS AND GYNECOLOGY | Facility: HOSPITAL | Age: 34
End: 2024-09-24

## 2024-09-24 ENCOUNTER — HOSPITAL ENCOUNTER (OUTPATIENT)
Dept: WOMENS IMAGING | Facility: HOSPITAL | Age: 34
Discharge: HOME | End: 2024-09-24
Admitting: OBSTETRICS & GYNECOLOGY

## 2024-09-24 VITALS — SYSTOLIC BLOOD PRESSURE: 114 MMHG | WEIGHT: 293 LBS | BODY MASS INDEX: 50.09 KG/M2 | DIASTOLIC BLOOD PRESSURE: 69 MMHG

## 2024-09-24 DIAGNOSIS — E11.9 TYPE 2 DIABETES MELLITUS WITHOUT COMPLICATION, WITH LONG-TERM CURRENT USE OF INSULIN: ICD-10-CM

## 2024-09-24 DIAGNOSIS — E11.9 TYPE 2 DIABETES MELLITUS WITHOUT COMPLICATION, WITH LONG-TERM CURRENT USE OF INSULIN: Primary | ICD-10-CM

## 2024-09-24 DIAGNOSIS — O42.919 PRETERM PREMATURE RUPTURE OF MEMBRANES (PPROM) WITH UNKNOWN ONSET OF LABOR: ICD-10-CM

## 2024-09-24 DIAGNOSIS — Z79.4 TYPE 2 DIABETES MELLITUS WITHOUT COMPLICATION, WITH LONG-TERM CURRENT USE OF INSULIN: Primary | ICD-10-CM

## 2024-09-24 DIAGNOSIS — Z79.4 TYPE 2 DIABETES MELLITUS WITHOUT COMPLICATION, WITH LONG-TERM CURRENT USE OF INSULIN: ICD-10-CM

## 2024-09-24 LAB
BILIRUB BLD-MCNC: NEGATIVE MG/DL
CLARITY, POC: CLEAR
COLOR UR: YELLOW
GLUCOSE UR STRIP-MCNC: NEGATIVE MG/DL
KETONES UR QL: ABNORMAL
LEUKOCYTE EST, POC: NEGATIVE
NITRITE UR-MCNC: NEGATIVE MG/ML
PH UR: 6 [PH] (ref 5–8)
PROT UR STRIP-MCNC: NEGATIVE MG/DL
RBC # UR STRIP: NEGATIVE /UL
SP GR UR: 1.02 (ref 1–1.03)
UROBILINOGEN UR QL: ABNORMAL

## 2024-09-24 PROCEDURE — 76815 OB US LIMITED FETUS(S): CPT | Performed by: OBSTETRICS & GYNECOLOGY

## 2024-09-24 PROCEDURE — 76815 OB US LIMITED FETUS(S): CPT

## 2024-09-27 ENCOUNTER — ROUTINE PRENATAL (OUTPATIENT)
Dept: OBSTETRICS AND GYNECOLOGY | Facility: HOSPITAL | Age: 34
End: 2024-09-27
Payer: MEDICAID

## 2024-09-27 ENCOUNTER — HOSPITAL ENCOUNTER (OUTPATIENT)
Dept: WOMENS IMAGING | Facility: HOSPITAL | Age: 34
Discharge: HOME | End: 2024-09-27
Admitting: OBSTETRICS & GYNECOLOGY

## 2024-09-27 VITALS — WEIGHT: 293 LBS | BODY MASS INDEX: 50.46 KG/M2 | SYSTOLIC BLOOD PRESSURE: 113 MMHG | DIASTOLIC BLOOD PRESSURE: 59 MMHG

## 2024-09-27 DIAGNOSIS — E11.9 TYPE 2 DIABETES MELLITUS WITHOUT COMPLICATION, WITH LONG-TERM CURRENT USE OF INSULIN: ICD-10-CM

## 2024-09-27 DIAGNOSIS — O42.919 PRETERM PREMATURE RUPTURE OF MEMBRANES (PPROM) WITH UNKNOWN ONSET OF LABOR: Primary | ICD-10-CM

## 2024-09-27 DIAGNOSIS — Z79.4 TYPE 2 DIABETES MELLITUS WITHOUT COMPLICATION, WITH LONG-TERM CURRENT USE OF INSULIN: ICD-10-CM

## 2024-09-27 DIAGNOSIS — Z3A.26 26 WEEKS GESTATION OF PREGNANCY: ICD-10-CM

## 2024-09-27 PROCEDURE — 76815 OB US LIMITED FETUS(S): CPT

## 2024-09-27 PROCEDURE — 99213 OFFICE O/P EST LOW 20 MIN: CPT | Performed by: OBSTETRICS & GYNECOLOGY

## 2024-09-27 RX ORDER — ACETAMINOPHEN 325 MG/1
650 TABLET ORAL EVERY 6 HOURS PRN
COMMUNITY
Start: 2024-08-27

## 2024-09-27 RX ORDER — INSULIN LISPRO 100 [IU]/ML
72 INJECTION, SOLUTION INTRAVENOUS; SUBCUTANEOUS EVERY 8 HOURS SCHEDULED
COMMUNITY
Start: 2024-08-30

## 2024-09-27 RX ORDER — CETIRIZINE HYDROCHLORIDE 10 MG/1
10 TABLET ORAL DAILY
COMMUNITY
Start: 2024-09-17

## 2024-09-27 RX ORDER — MAGNESIUM HYDROXIDE/ALUMINUM HYDROXICE/SIMETHICONE 120; 1200; 1200 MG/30ML; MG/30ML; MG/30ML
30 SUSPENSION ORAL EVERY 6 HOURS PRN
COMMUNITY
Start: 2024-07-15

## 2024-09-27 NOTE — PROGRESS NOTES
[place] IPAD : Vitor [id number]  Pt reports vag bleeding that is red and pink. Denies contractions. Reports leaking clear vag fluid last several days. Temp 98.0 oral. Denies any genetic testing. States FM has decreased recently.

## 2024-09-30 ENCOUNTER — HOSPITAL ENCOUNTER (INPATIENT)
Facility: HOSPITAL | Age: 34
LOS: 1 days | Discharge: OTHER INSTITUTION | DRG: 831 | End: 2024-09-30
Attending: OBSTETRICS & GYNECOLOGY | Admitting: OBSTETRICS & GYNECOLOGY
Payer: MEDICAID

## 2024-09-30 VITALS
OXYGEN SATURATION: 95 % | RESPIRATION RATE: 18 BRPM | SYSTOLIC BLOOD PRESSURE: 117 MMHG | TEMPERATURE: 98.2 F | HEART RATE: 106 BPM | DIASTOLIC BLOOD PRESSURE: 47 MMHG

## 2024-09-30 LAB
ABO GROUP BLD: NORMAL
ALBUMIN SERPL-MCNC: 3.6 G/DL (ref 3.5–5.2)
ALBUMIN/GLOB SERPL: 1.2 G/DL
ALP SERPL-CCNC: 50 U/L (ref 39–117)
ALT SERPL W P-5'-P-CCNC: 9 U/L (ref 1–33)
ANION GAP SERPL CALCULATED.3IONS-SCNC: 14 MMOL/L (ref 5–15)
AST SERPL-CCNC: 11 U/L (ref 1–32)
BASOPHILS # BLD AUTO: 0.02 10*3/MM3 (ref 0–0.2)
BASOPHILS NFR BLD AUTO: 0.1 % (ref 0–1.5)
BILIRUB SERPL-MCNC: 0.3 MG/DL (ref 0–1.2)
BLD GP AB SCN SERPL QL: NEGATIVE
BUN SERPL-MCNC: 5 MG/DL (ref 6–20)
BUN/CREAT SERPL: 10.9 (ref 7–25)
CALCIUM SPEC-SCNC: 9.5 MG/DL (ref 8.6–10.5)
CHLORIDE SERPL-SCNC: 102 MMOL/L (ref 98–107)
CO2 SERPL-SCNC: 21 MMOL/L (ref 22–29)
CREAT SERPL-MCNC: 0.46 MG/DL (ref 0.57–1)
D-LACTATE SERPL-SCNC: 1.7 MMOL/L (ref 0.5–2)
D-LACTATE SERPL-SCNC: 2.3 MMOL/L (ref 0.5–2)
DEPRECATED RDW RBC AUTO: 42.1 FL (ref 37–54)
EGFRCR SERPLBLD CKD-EPI 2021: 130.6 ML/MIN/1.73
EOSINOPHIL # BLD AUTO: 0.1 10*3/MM3 (ref 0–0.4)
EOSINOPHIL NFR BLD AUTO: 0.7 % (ref 0.3–6.2)
ERYTHROCYTE [DISTWIDTH] IN BLOOD BY AUTOMATED COUNT: 13 % (ref 12.3–15.4)
GLOBULIN UR ELPH-MCNC: 3 GM/DL
GLUCOSE SERPL-MCNC: 138 MG/DL (ref 65–99)
HCT VFR BLD AUTO: 37.9 % (ref 34–46.6)
HGB BLD-MCNC: 12.9 G/DL (ref 12–15.9)
IMM GRANULOCYTES # BLD AUTO: 0.11 10*3/MM3 (ref 0–0.05)
IMM GRANULOCYTES NFR BLD AUTO: 0.8 % (ref 0–0.5)
LYMPHOCYTES # BLD AUTO: 1.23 10*3/MM3 (ref 0.7–3.1)
LYMPHOCYTES NFR BLD AUTO: 8.5 % (ref 19.6–45.3)
MCH RBC QN AUTO: 30.2 PG (ref 26.6–33)
MCHC RBC AUTO-ENTMCNC: 34 G/DL (ref 31.5–35.7)
MCV RBC AUTO: 88.8 FL (ref 79–97)
MONOCYTES # BLD AUTO: 0.88 10*3/MM3 (ref 0.1–0.9)
MONOCYTES NFR BLD AUTO: 6.1 % (ref 5–12)
NEUTROPHILS NFR BLD AUTO: 12.15 10*3/MM3 (ref 1.7–7)
NEUTROPHILS NFR BLD AUTO: 83.8 % (ref 42.7–76)
NRBC BLD AUTO-RTO: 0 /100 WBC (ref 0–0.2)
PLATELET # BLD AUTO: 210 10*3/MM3 (ref 140–450)
PMV BLD AUTO: 10.3 FL (ref 6–12)
POTASSIUM SERPL-SCNC: 3.7 MMOL/L (ref 3.5–5.2)
PROT SERPL-MCNC: 6.6 G/DL (ref 6–8.5)
RBC # BLD AUTO: 4.27 10*6/MM3 (ref 3.77–5.28)
RH BLD: POSITIVE
SODIUM SERPL-SCNC: 137 MMOL/L (ref 136–145)
T&S EXPIRATION DATE: NORMAL
WBC NRBC COR # BLD AUTO: 14.49 10*3/MM3 (ref 3.4–10.8)

## 2024-09-30 PROCEDURE — 59025 FETAL NON-STRESS TEST: CPT

## 2024-09-30 PROCEDURE — 25010000002 ONDANSETRON PER 1 MG: Performed by: OBSTETRICS & GYNECOLOGY

## 2024-09-30 PROCEDURE — 25010000002 AMPICILLIN PER 500 MG: Performed by: OBSTETRICS & GYNECOLOGY

## 2024-09-30 PROCEDURE — 86900 BLOOD TYPING SEROLOGIC ABO: CPT | Performed by: OBSTETRICS & GYNECOLOGY

## 2024-09-30 PROCEDURE — 83605 ASSAY OF LACTIC ACID: CPT | Performed by: OBSTETRICS & GYNECOLOGY

## 2024-09-30 PROCEDURE — 59025 FETAL NON-STRESS TEST: CPT | Performed by: OBSTETRICS & GYNECOLOGY

## 2024-09-30 PROCEDURE — 85025 COMPLETE CBC W/AUTO DIFF WBC: CPT | Performed by: OBSTETRICS & GYNECOLOGY

## 2024-09-30 PROCEDURE — 86850 RBC ANTIBODY SCREEN: CPT | Performed by: OBSTETRICS & GYNECOLOGY

## 2024-09-30 PROCEDURE — 99222 1ST HOSP IP/OBS MODERATE 55: CPT | Performed by: OBSTETRICS & GYNECOLOGY

## 2024-09-30 PROCEDURE — 86901 BLOOD TYPING SEROLOGIC RH(D): CPT | Performed by: OBSTETRICS & GYNECOLOGY

## 2024-09-30 PROCEDURE — 80053 COMPREHEN METABOLIC PANEL: CPT | Performed by: OBSTETRICS & GYNECOLOGY

## 2024-09-30 PROCEDURE — 25810000003 LACTATED RINGERS PER 1000 ML: Performed by: OBSTETRICS & GYNECOLOGY

## 2024-09-30 PROCEDURE — 25010000002 GENTAMICIN PER 80 MG: Performed by: OBSTETRICS & GYNECOLOGY

## 2024-09-30 PROCEDURE — 25010000002 BETAMETHASONE ACET & SOD PHOS PER 4 MG: Performed by: OBSTETRICS & GYNECOLOGY

## 2024-09-30 RX ORDER — FAMOTIDINE 20 MG/1
20 TABLET, FILM COATED ORAL
Status: DISCONTINUED | OUTPATIENT
Start: 2024-09-30 | End: 2024-09-30 | Stop reason: HOSPADM

## 2024-09-30 RX ORDER — SODIUM CHLORIDE 9 MG/ML
40 INJECTION, SOLUTION INTRAVENOUS AS NEEDED
Status: DISCONTINUED | OUTPATIENT
Start: 2024-09-30 | End: 2024-09-30 | Stop reason: HOSPADM

## 2024-09-30 RX ORDER — ONDANSETRON 4 MG/1
8 TABLET, ORALLY DISINTEGRATING ORAL EVERY 8 HOURS PRN
Status: DISCONTINUED | OUTPATIENT
Start: 2024-09-30 | End: 2024-09-30 | Stop reason: HOSPADM

## 2024-09-30 RX ORDER — DOCUSATE SODIUM 100 MG/1
100 CAPSULE, LIQUID FILLED ORAL 2 TIMES DAILY PRN
Status: DISCONTINUED | OUTPATIENT
Start: 2024-09-30 | End: 2024-09-30 | Stop reason: HOSPADM

## 2024-09-30 RX ORDER — ONDANSETRON 2 MG/ML
4 INJECTION INTRAMUSCULAR; INTRAVENOUS EVERY 6 HOURS PRN
Status: DISCONTINUED | OUTPATIENT
Start: 2024-09-30 | End: 2024-09-30 | Stop reason: HOSPADM

## 2024-09-30 RX ORDER — LIDOCAINE HYDROCHLORIDE 10 MG/ML
0.5 INJECTION, SOLUTION EPIDURAL; INFILTRATION; INTRACAUDAL; PERINEURAL ONCE AS NEEDED
Status: DISCONTINUED | OUTPATIENT
Start: 2024-09-30 | End: 2024-09-30 | Stop reason: HOSPADM

## 2024-09-30 RX ORDER — ONDANSETRON 2 MG/ML
4 INJECTION INTRAMUSCULAR; INTRAVENOUS EVERY 8 HOURS PRN
Status: DISCONTINUED | OUTPATIENT
Start: 2024-09-30 | End: 2024-09-30 | Stop reason: SDUPTHER

## 2024-09-30 RX ORDER — BISACODYL 10 MG
10 SUPPOSITORY, RECTAL RECTAL DAILY PRN
Status: DISCONTINUED | OUTPATIENT
Start: 2024-09-30 | End: 2024-09-30 | Stop reason: HOSPADM

## 2024-09-30 RX ORDER — SODIUM CHLORIDE 0.9 % (FLUSH) 0.9 %
10 SYRINGE (ML) INJECTION EVERY 12 HOURS SCHEDULED
Status: DISCONTINUED | OUTPATIENT
Start: 2024-09-30 | End: 2024-09-30 | Stop reason: HOSPADM

## 2024-09-30 RX ORDER — SODIUM CHLORIDE, SODIUM LACTATE, POTASSIUM CHLORIDE, CALCIUM CHLORIDE 600; 310; 30; 20 MG/100ML; MG/100ML; MG/100ML; MG/100ML
75 INJECTION, SOLUTION INTRAVENOUS CONTINUOUS
Status: DISCONTINUED | OUTPATIENT
Start: 2024-09-30 | End: 2024-09-30 | Stop reason: HOSPADM

## 2024-09-30 RX ORDER — BETAMETHASONE SODIUM PHOSPHATE AND BETAMETHASONE ACETATE 3; 3 MG/ML; MG/ML
12 INJECTION, SUSPENSION INTRA-ARTICULAR; INTRALESIONAL; INTRAMUSCULAR; SOFT TISSUE EVERY 24 HOURS
Status: DISCONTINUED | OUTPATIENT
Start: 2024-09-30 | End: 2024-09-30 | Stop reason: HOSPADM

## 2024-09-30 RX ORDER — SODIUM CHLORIDE 0.9 % (FLUSH) 0.9 %
10 SYRINGE (ML) INJECTION AS NEEDED
Status: DISCONTINUED | OUTPATIENT
Start: 2024-09-30 | End: 2024-09-30 | Stop reason: HOSPADM

## 2024-09-30 RX ORDER — ACETAMINOPHEN 325 MG/1
650 TABLET ORAL EVERY 4 HOURS PRN
Status: DISCONTINUED | OUTPATIENT
Start: 2024-09-30 | End: 2024-09-30 | Stop reason: HOSPADM

## 2024-09-30 RX ORDER — MAGNESIUM SULFATE HEPTAHYDRATE 40 MG/ML
2 INJECTION, SOLUTION INTRAVENOUS CONTINUOUS
Status: DISCONTINUED | OUTPATIENT
Start: 2024-09-30 | End: 2024-09-30 | Stop reason: HOSPADM

## 2024-09-30 RX ADMIN — SODIUM CHLORIDE, POTASSIUM CHLORIDE, SODIUM LACTATE AND CALCIUM CHLORIDE 75 ML/HR: 600; 310; 30; 20 INJECTION, SOLUTION INTRAVENOUS at 08:50

## 2024-09-30 RX ADMIN — GENTAMICIN SULFATE 450 MG: 40 INJECTION, SOLUTION INTRAMUSCULAR; INTRAVENOUS at 09:25

## 2024-09-30 RX ADMIN — BETAMETHASONE SODIUM PHOSPHATE AND BETAMETHASONE ACETATE 12 MG: 3; 3 INJECTION, SUSPENSION INTRA-ARTICULAR; INTRALESIONAL; INTRAMUSCULAR at 08:56

## 2024-09-30 RX ADMIN — ONDANSETRON 4 MG: 2 INJECTION INTRAMUSCULAR; INTRAVENOUS at 08:45

## 2024-09-30 RX ADMIN — AMPICILLIN SODIUM 2000 MG: 2 INJECTION, POWDER, FOR SOLUTION INTRAMUSCULAR; INTRAVENOUS at 08:50

## 2024-09-30 NOTE — PROGRESS NOTES
WBC returned elevated at 14.5 with left shift, elevated lactate and fundal tenderness. Picture is overall concerning for chorioamnionitis. We discussed that given these findings recommendation would be to transfer to [facility] for delivery given GA and prolonged rupture with chorioamnionitis. Patient and  are agreeable. Will start on magnesium, abx, rescue steroids.     Richy Frederick MD, FACOG  Maternal Fetal Medicine, [facility]

## 2024-09-30 NOTE — H&P
Demetra Betancur  1990  [id number]  [id number]    CC: abdominal pain  HPI:  Patient is 33 y.o. female   currently at 26w3d presents with c/o lower abd pain, onset 0650, intermittent, still leaking and bloody show.  Good FM.  PNC comp by PPROM at 19 weeks, IDDM, BMI 50, and prev .    PMH:  Current meds: PNV, baby ASA, Lantis and Humalog  Illnesses: IDDM  Surgeries: , lap aisha  Allergies: NKDA    Past OB History:       OB History    Para Term  AB Living   4 2 2 0 1 2   SAB IAB Ectopic Molar Multiple Live Births   1 0 0 0 0 2      # Outcome Date GA Lbr Clayton/2nd Weight Sex Type Anes PTL Lv   4 Current            3 SAB 2024 6w0d          2 Term 17 38w0d   M CS-LTranv EPI  REN      Complications: Macrosomia   1 Term 16 41w0d   F Vag-Spont EPI  REN      Obstetric Comments   FOB #1 Pregnancy 1&2   FOB #2 Pregnancy 3&4            SH: tob neg , EtOH neg, drugs neg      General ROS: abd pain, leaking.   All other systems reviewed and are negative.      Physical Examination: General appearance - alert, well appearing, and in no distress  Vital signs - There were no vitals taken for this visit.  HEENT: normocephalic, atraumatic,oropharynx clear, appearance of ears and nose normal  Neck - supple, no significant adenopathy, no thyromegaly  Lymphatics - no palpable lymphadenopathy in the neck or groin, no hepatosplenomegaly  Chest - clear to auscultation, no wheezes, rales or rhonchi, respiratory effort non-labored  Heart - normal rate, regular rhythm, no murmurs, rubs, clicks or gallops, no JVD, trace lower extremity edema  Abdomen - soft, significant tenderness around and on uterus, nondistended, no masses, no hepatosplenomegaly, no rebound tenderness noted, bowel sounds normal  Vaginal Exam: cl-1/50%/ballot,external genitalia normal  Extremities - trace pedal edema noted, no calf tend  Skin -warm and dry, normal coloration and turgor, no rashes, no suspicious skin  lesions noted      Fetal monitoring: indication abd pain PPROM, onset 0712, offset 0730, baseline 145, min-mod BTB variability, multiple accels (15 X 15), small variable decel, poss  contractions, interpretation reactive NST with small  variable    Radiology US:     Assessment 1)IUP 26 3/7 weeks   2)PPROM at 19 weeks- s/p BMZ  and    3)new onset abd pain with uterine tend- worrisome for chorio   4)prev    5)BMI 50    Plan 1)admit   2)PDC consult this am   3)labs   4)discussed with Dr. Otoniel Keita MD  2024  07:19 EDT

## 2024-09-30 NOTE — ASSESSMENT & PLAN NOTE
Abdomen non-tender. Stable leakage of fluid per report. AF/VSS. Reviewed again reasons to report to Triage for evaluation.     - Will continue to watch closely outpatient with plans to admit at 28wks   - PPROM precautions reviewed in detail

## 2024-09-30 NOTE — PROGRESS NOTES
Labourist:    M has seen Ms. Betancur and agree with the diagnosis of PPROM with chorioamnionitis.  They recommend transfer to [facility] due to diagnosis prior to 28 weeks gestation.    Lab Results   Component Value Date     2024    HGB 12.9 2024    HCT 37.9 2024    WBC 14.49 (H) 2024      Lab Results   Component Value Date     2024    K 3.7 2024     2024    CO2 21.0 (L) 2024    BUN 5 (L) 2024    CREATININE 0.46 (L) 2024    GLUCOSE 138 (H) 2024    ALBUMIN 3.6 2024    CALCIUM 9.5 2024    AST 11 2024    ALT 9 2024    BILITOT 0.3 2024      Lactate:     2.3      A/P    33  at 26 3/7 weeks complicated by PPROM at 19 weeks.    S/P BMZ on  and .     Now with chorioamnionitis.      IV  Magnesium for neuro protection  Initiate rescue steroids BMZ  Amp and Gent for chorio  Transfer to [facility]

## 2024-09-30 NOTE — PROGRESS NOTES
Follow-Up Prenatal Visit     Patient Name: Demetra Betancur  : 1990   MRN: [id number]     Subjective     Demetra Betancur is a 33 y.o.  at 26w0d who is here today for follow-up prenatal visit secondary to pre-viable PPROM currently being managed outpatient. Patient reports slight decrease in the fetal movement recently. She has continued to have leakage of fluid, worse when she drinks more water. Sometimes it is clear and sometimes it is red or pink. She denies F/C/ctx's.      Objective     Vitals:    24 0937   BP: 113/59   Weight: (!) 138 kg (303 lb 3.2 oz)     Body mass index is 50.46 kg/m².    Physical Exam  Constitutional:       Appearance: Normal appearance. She is obese. She is not ill-appearing or toxic-appearing.   HENT:      Head: Normocephalic and atraumatic.   Cardiovascular:      Rate and Rhythm: Normal rate.   Pulmonary:      Effort: Pulmonary effort is normal.   Abdominal:      Tenderness: There is no abdominal tenderness. There is no guarding or rebound.   Musculoskeletal:         General: Normal range of motion.      Cervical back: Normal range of motion.   Skin:     General: Skin is warm and dry.   Neurological:      General: No focal deficit present.      Mental Status: She is alert and oriented to person, place, and time.   Psychiatric:         Mood and Affect: Mood normal.         Behavior: Behavior normal.         Thought Content: Thought content normal.         Judgment: Judgment normal.     US done today. Normal FHR. +fetal movement. Oligohydramnios noted.     Assessment / Plan      Assessment/Plan:   Diagnoses and all orders for this visit:    1.  premature rupture of membranes (PPROM) with unknown onset of labor (Primary)  Assessment & Plan:  Abdomen non-tender. Stable leakage of fluid per report. AF/VSS. Reviewed again reasons to report to Triage for evaluation.     - Will continue to watch closely outpatient with plans to admit at 28wks   -  PPROM precautions reviewed in detail      2. Type 2 diabetes mellitus without complication, with long-term current use of insulin  Assessment & Plan:  Patient reports her fasting blood sugars are  and her 2hr post-prandial are 160's. She is currently taking Lantus 100u BID and Lispro 72u with meals.      - Recommend she increase the Lispro to 84u  with meals      3. 26 weeks gestation of pregnancy        Follow Up:   Return in about 4 days (around 10/1/2024).    I spent 20 minutes caring for Demetra on this date of service. This time includes time spent by me in the following activities: preparing for the visit, obtaining and/or reviewing a separately obtained history, performing a medically appropriate examination and/or evaluation , counseling and educating the patient/family/caregiver, ordering medications, tests, or procedures, and documenting information in the medical record    eMgha Polanco MD

## 2024-09-30 NOTE — PROGRESS NOTES
This  had met the patient and her spouse during previous hospitalization.  Provided empathic listening, prayer, non medical interpretation for patient and spouse (I speak [place]).  As the patient expressed anxiety about going to , called the Pastoral [facility] office at  to alert a  of her arrival so that they can provide additional emotional support.